# Patient Record
Sex: FEMALE | Race: BLACK OR AFRICAN AMERICAN | NOT HISPANIC OR LATINO | Employment: STUDENT | URBAN - METROPOLITAN AREA
[De-identification: names, ages, dates, MRNs, and addresses within clinical notes are randomized per-mention and may not be internally consistent; named-entity substitution may affect disease eponyms.]

---
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
## 2021-03-22 ENCOUNTER — OFFICE VISIT (OUTPATIENT)
Dept: FAMILY MEDICINE | Facility: CLINIC | Age: 21
End: 2021-03-22
Payer: COMMERCIAL

## 2021-03-22 VITALS
WEIGHT: 127 LBS | TEMPERATURE: 98.2 F | HEIGHT: 65 IN | HEART RATE: 87 BPM | DIASTOLIC BLOOD PRESSURE: 62 MMHG | SYSTOLIC BLOOD PRESSURE: 114 MMHG | OXYGEN SATURATION: 99 % | BODY MASS INDEX: 21.16 KG/M2

## 2021-03-22 DIAGNOSIS — N92.0 MENORRHAGIA WITH REGULAR CYCLE: Primary | ICD-10-CM

## 2021-03-22 DIAGNOSIS — L70.0 ACNE VULGARIS: ICD-10-CM

## 2021-03-22 PROBLEM — Z30.9 CONTRACEPTIVE MANAGEMENT: Status: ACTIVE | Noted: 2021-03-22

## 2021-03-22 LAB — HCG UR QL: NEGATIVE

## 2021-03-22 PROCEDURE — 99213 OFFICE O/P EST LOW 20 MIN: CPT | Performed by: FAMILY MEDICINE

## 2021-03-22 PROCEDURE — 81025 URINE PREGNANCY TEST: CPT | Performed by: FAMILY MEDICINE

## 2021-03-22 RX ORDER — TRETINOIN 0.25 MG/G
CREAM TOPICAL AT BEDTIME
Qty: 45 G | Refills: 1 | Status: SHIPPED | OUTPATIENT
Start: 2021-03-22 | End: 2024-01-18

## 2021-03-22 ASSESSMENT — MIFFLIN-ST. JEOR: SCORE: 1351.95

## 2021-03-22 NOTE — PROGRESS NOTES
"    Assessment & Plan     Menorrhagia with regular cycle  Pregnancy test is negative.  Starting patient on Joleen to help her with heavy menses.  Instructed to start the medication tomorrow.  Patient agrees to the plan  - HCG Qual, Urine ([id number])  - drospirenone-ethinyl estradiol (JOLEEN) 3-0.03 MG tablet; Take 1 tablet by mouth daily    Acne vulgaris  Patient has used Retin-A in the past and that helped her.  Hopefully with the OCPs acne should improve as well but Retin-A refill ordered.  - tretinoin (RETIN-A) 0.025 % external cream; Apply topically At Bedtime      Rick Reeves MD  [facility] LEW Hameed is a 20 year old who presents for the following health issues     HPI   Patient reports of getting very heavy menstrual flow.  It is regular.  She gets 10 days of heavy bleeding.  She tells that it causes fatigue.  In the past she has tried OCPs which helped her with that but because of mood disorder.  Would like to try different kind of pill.  She is not sexually active.    Also tells that she gets acne outbreaks on the face off and on.  In the past she has used Retin-A which helps.  Needs a new prescription.        Review of Systems   CONSTITUTIONAL: NEGATIVE for fever, chills, change in weight  ENT/MOUTH: NEGATIVE for ear, mouth and throat problems  RESP: NEGATIVE for significant cough or SOB  CV: NEGATIVE for chest pain, palpitations or peripheral edema      Objective    /62 (BP Location: Right arm, Cuff Size: Adult Regular)   Pulse 87   Temp 98.2  F (36.8  C) (Tympanic)   Ht 1.651 m (5' 5\")   Wt 57.6 kg (127 lb)   LMP 03/21/2021   SpO2 99%   BMI 21.13 kg/m    Body mass index is 21.13 kg/m .  Physical Exam   GENERAL: healthy, alert and no distress  RESP: lungs clear to auscultation - no rales, rhonchi or wheezes  CV: regular rate and rhythm, normal S1 S2, no S3 or S4, no murmur, click or rub, no peripheral edema and peripheral pulses strong  ABDOMEN: soft, " nontender, no hepatosplenomegaly, no masses and bowel sounds normal  SKIN: Papules noted on the face.      Results for orders placed or performed in visit on 03/22/21   HCG Qual, Urine ([id number])     Status: None   Result Value Ref Range    HCG Qual Urine Negative NEG^Negative

## 2021-03-23 ENCOUNTER — TELEPHONE (OUTPATIENT)
Dept: FAMILY MEDICINE | Facility: CLINIC | Age: 21
End: 2021-03-23

## 2021-03-23 RX ORDER — DROSPIRENONE AND ETHINYL ESTRADIOL 0.03MG-3MG
1 KIT ORAL DAILY
Qty: 90 TABLET | Refills: 1 | Status: SHIPPED | OUTPATIENT
Start: 2021-03-23 | End: 2022-01-18

## 2021-03-23 NOTE — RESULT ENCOUNTER NOTE
Please call the patient to notify patient that the pregnancy test is negative. I am ordering generic Joleen to her pharmacy. Please have her start taking the medication today.   Thanks      Rick Reeves MD

## 2021-03-23 NOTE — TELEPHONE ENCOUNTER
----- Message from Rick Reeves MD sent at 3/23/2021  8:23 AM CDT -----  Please call the patient to notify patient that the pregnancy test is negative. I am ordering generic Joleen to her pharmacy. Please have her start taking the medication today.   Thanks      Rick Reeves MD      S/w pt and gave Dr. Reeves's message above.  Pt states understanding and will go to the pharmacy and  the ocps.    Keira BUTCHER RN  EP Triage

## 2021-06-07 ENCOUNTER — OFFICE VISIT (OUTPATIENT)
Dept: FAMILY MEDICINE | Facility: CLINIC | Age: 21
End: 2021-06-07
Payer: COMMERCIAL

## 2021-06-07 VITALS
WEIGHT: 125 LBS | DIASTOLIC BLOOD PRESSURE: 62 MMHG | OXYGEN SATURATION: 100 % | BODY MASS INDEX: 20.83 KG/M2 | HEART RATE: 94 BPM | TEMPERATURE: 98.2 F | HEIGHT: 65 IN | SYSTOLIC BLOOD PRESSURE: 102 MMHG

## 2021-06-07 DIAGNOSIS — F43.22 ADJUSTMENT DISORDER WITH ANXIOUS MOOD: Primary | ICD-10-CM

## 2021-06-07 DIAGNOSIS — K21.00 GASTROESOPHAGEAL REFLUX DISEASE WITH ESOPHAGITIS WITHOUT HEMORRHAGE: ICD-10-CM

## 2021-06-07 DIAGNOSIS — T78.40XA ALLERGIC REACTION, INITIAL ENCOUNTER: ICD-10-CM

## 2021-06-07 PROCEDURE — 99214 OFFICE O/P EST MOD 30 MIN: CPT | Performed by: FAMILY MEDICINE

## 2021-06-07 ASSESSMENT — MIFFLIN-ST. JEOR: SCORE: 1337.88

## 2021-06-07 ASSESSMENT — PAIN SCALES - GENERAL: PAINLEVEL: NO PAIN (0)

## 2021-06-07 NOTE — PROGRESS NOTES
"    Assessment & Plan     Adjustment disorder with anxious mood  Has been worsening with stress and anxiety, will have her to start BCT  - MENTAL HEALTH REFERRAL  - Adult; Outpatient Treatment; Individual/Couples/Family/Group Therapy/Health Psychology[place] [facility] [place] [facility] [place] [facility] [phone]; We will contact you to schedule the appointment or please call with any questions    Gastroesophageal reflux disease with esophagitis without hemorrhage  Has mild GERD, encouraged her to try TUMS and not taking spicy food     Allergic reaction, initial encounter  Improving, encouraged her to keep monitoring and consider trial of antihistamine as needed            FUTURE APPOINTMENTS:       - Follow-up visit in 6 months for CPE    No follow-ups on file.    Tim Francis MD  [facility] LEW Hameed is a 21 year old who presents for the following health issues     HPI     Rash  Onset/Duration: after vaccine   Description  Location: both of her arms and face  Character: happened after covid vaccine   Itching: mild  Intensity:  moderate  Progression of Symptoms:  improving  Accompanying signs and symptoms:   Fever: no  Body aches or joint pain: no  Sore throat symptoms: no  Recent cold symptoms: no  History:           Previous episodes of similar rash: None  New exposures:  None  Recent travel: no  Exposure to similar rash: no  Precipitating or alleviating factors:     Therapies tried and outcome: none    Concern - troubles swallowing  Onset: x 2 weeks  Description: feels like something is stuck in throat after she is finished eating.  And other times she will have that feeling for no reason        Review of Systems   Constitutional, HEENT, cardiovascular, pulmonary, gi and gu systems are negative, except as otherwise noted.      Objective    /62   Pulse 94   Temp 98.2  F (36.8  C) (Tympanic)   Ht 1.651 m (5' 5\")   Wt 56.7 kg (125 lb)   LMP 05/30/2021   SpO2 100%   BMI 20.80 " kg/m    Body mass index is 20.8 kg/m .  Physical Exam   GENERAL: healthy, alert and no distress  EYES: Eyes grossly normal to inspection, PERRL and conjunctivae and sclerae normal  HENT: ear canals and TM's normal, nose and mouth without ulcers or lesions  NECK: no adenopathy, no asymmetry, masses, or scars and thyroid normal to palpation  RESP: lungs clear to auscultation - no rales, rhonchi or wheezes  CV: regular rate and rhythm, normal S1 S2, no S3 or S4, no murmur, click or rub, no peripheral edema and peripheral pulses strong  ABDOMEN: soft, nontender, no hepatosplenomegaly, no masses and bowel sounds normal  MS: no gross musculoskeletal defects noted, no edema  SKIN: no suspicious lesions or rashes  NEURO: Normal strength and tone, mentation intact and speech normal  BACK: no CVA tenderness, no paralumbar tenderness

## 2021-09-14 ENCOUNTER — HOSPITAL ENCOUNTER (EMERGENCY)
Facility: CLINIC | Age: 21
Discharge: LEFT BEFORE DISCHARGE | End: 2021-09-14
Payer: COMMERCIAL

## 2021-09-14 VITALS
RESPIRATION RATE: 14 BRPM | SYSTOLIC BLOOD PRESSURE: 108 MMHG | DIASTOLIC BLOOD PRESSURE: 81 MMHG | HEART RATE: 80 BPM | TEMPERATURE: 97.9 F | OXYGEN SATURATION: 99 %

## 2021-09-22 ENCOUNTER — NURSE TRIAGE (OUTPATIENT)
Dept: NURSING | Facility: CLINIC | Age: 21
End: 2021-09-22

## 2021-09-22 NOTE — TELEPHONE ENCOUNTER
Mom says patient is very sick with nausea and vomiting x 1.5 week.     Advised cannot triage without patient being present and no Consent to Communicate. Advised if she cannot get patient into clinic for a scheduled appointment then she can be seen in urgent care or ED. Advised urgent care would be faster.    Mom says she will take patient to [facility].    Additional Information    Information only question and nurse able to answer    Negative: Nursing judgment    Negative: Nursing judgment    Negative: Nursing judgment    Negative: Nursing judgment    Protocols used: INFORMATION ONLY CALL - NO TRIAGE-A-OH

## 2021-10-04 ENCOUNTER — OFFICE VISIT (OUTPATIENT)
Dept: FAMILY MEDICINE | Facility: CLINIC | Age: 21
End: 2021-10-04
Payer: COMMERCIAL

## 2021-10-04 VITALS
SYSTOLIC BLOOD PRESSURE: 103 MMHG | DIASTOLIC BLOOD PRESSURE: 75 MMHG | RESPIRATION RATE: 16 BRPM | HEIGHT: 66 IN | WEIGHT: 127.8 LBS | OXYGEN SATURATION: 98 % | TEMPERATURE: 97.8 F | HEART RATE: 88 BPM | BODY MASS INDEX: 20.54 KG/M2

## 2021-10-04 DIAGNOSIS — R51.9 DAILY HEADACHE: ICD-10-CM

## 2021-10-04 DIAGNOSIS — R11.11 INTRACTABLE VOMITING WITHOUT NAUSEA, UNSPECIFIED VOMITING TYPE: Primary | ICD-10-CM

## 2021-10-04 PROCEDURE — 99214 OFFICE O/P EST MOD 30 MIN: CPT | Performed by: NURSE PRACTITIONER

## 2021-10-04 RX ORDER — ONDANSETRON 4 MG/1
4 TABLET, ORALLY DISINTEGRATING ORAL EVERY 8 HOURS PRN
Qty: 18 TABLET | Refills: 1 | Status: SHIPPED | OUTPATIENT
Start: 2021-10-04 | End: 2024-01-18

## 2021-10-04 RX ORDER — SUCRALFATE 1 G/1
1 TABLET ORAL 4 TIMES DAILY
Qty: 56 TABLET | Refills: 0 | Status: SHIPPED | OUTPATIENT
Start: 2021-10-04 | End: 2021-10-18

## 2021-10-04 RX ORDER — PANTOPRAZOLE SODIUM 40 MG/1
40 TABLET, DELAYED RELEASE ORAL DAILY
Qty: 60 TABLET | Refills: 1 | Status: SHIPPED | OUTPATIENT
Start: 2021-10-04 | End: 2022-01-18

## 2021-10-04 ASSESSMENT — MIFFLIN-ST. JEOR: SCORE: 1365.58

## 2021-10-04 ASSESSMENT — PAIN SCALES - GENERAL: PAINLEVEL: NO PAIN (0)

## 2021-10-04 NOTE — PROGRESS NOTES
Assessment & Plan     Intractable vomiting without nausea, unspecified vomiting type  - Neurology Referral (Migraine Care Package)  - pantoprazole (PROTONIX) 40 MG EC tablet  Dispense: 60 tablet; Refill: 1  - sucralfate (CARAFATE) 1 GM tablet  Dispense: 56 tablet; Refill: 0  - ondansetron (ZOFRAN-ODT) 4 MG ODT tab  Dispense: 18 tablet; Refill: 1    Daily headache  - Neurology Referral (Migraine Care Package)    Comment: Can likely rule out gastroparesis (not related to meals; normal bowel habits; no abdominal pain, distension, bloating, burping), pregnancy, PUD (no epigastric pain); bowel obstruction (normal BMs), cannabis use. I suspect that it could be related to chronic migraine issues. She has never been able to find a regimen that satisfactorily addresses her near daily headaches, but she has never been seen by headache specialty either. I will send her to neuro / headache specialty for further workup and to explore treatment options. Also, given chronic NSAID use (for headache), I will treat for potential gastritis / GERD with sucralfate and PPI. I will also provide ondansetron to be used on days were vomiting is an issue. Discussed reasons to call or return to clinic. Zari acknowledges and demonstrates understanding of circumstances under which care should be sought urgently or emergently. Follow up as discussed. Discussed risks, benefits, alternatives, potential side effects, and proper administration of new medication / treatment. Agrees with plan of care. All questions answered.      See Patient Instructions    Return in about 4 weeks (around 11/1/2021) for persistent or worsening symptoms.    Omar Queen NP  [facility]   Zari is a 21 year old who presents for the following health issues       Concern - vomiting  Onset: 2 weeks  Description: vomiting at random times, any where from once to 4 or more times per day. Pt vomits food and bile  Intensity: mild,  "moderate  Progression of Symptoms:  same  Accompanying Signs & Symptoms: none  Previous history of similar problem: none  Precipitating factors:        Worsened by: none  Alleviating factors:        Improved by: none  Therapies tried and outcome:  none     HPI: Zari presents today with the complaint of intermittent vomiting for the past 2 weeks. New issue for her.     Started with really bad migraine 2 weeks ago.     Now vomiting in the absence of nausea, abdominal pain, or headache.     No relation to meals.    Gets a feeling in anterior neck / throat. Begins gagging and throwing up.     Hasn't gone more than 2 days without vomiting.     0-4 times per day.     No clear trigger.     No constipation or diarrhea (daily BMs).     No chance of pregnancy.    No use of cannabis.     No change in headache frequency or intensity during this episode.     Uses a lot of ibuprofen for headaches.     Review of Systems   Constitutional, HEENT, GI, neuro systems are negative, except as otherwise noted.      Objective    /75 (BP Location: Left arm, Cuff Size: Adult Regular)   Pulse 88   Temp 97.8  F (36.6  C) (Oral)   Resp 16   Ht 1.675 m (5' 5.95\")   Wt 58 kg (127 lb 12.8 oz)   SpO2 98%   BMI 20.66 kg/m    Body mass index is 20.66 kg/m .  Physical Exam   GENERAL: healthy, alert and no distress  HENT: ear canals and TM's normal, nose and mouth without ulcers or lesions  NECK: no adenopathy, no asymmetry, masses, or scars and thyroid normal to palpation  RESP: lungs clear to auscultation - no rales, rhonchi or wheezes  CV: regular rate and rhythm, normal S1 S2, no S3 or S4, no murmur, click or rub, no peripheral edema and peripheral pulses strong  ABDOMEN: soft, nontender, no hepatosplenomegaly, no masses and bowel sounds normal; Negative Maxwell sign and Rovsing sign; No guarding, rigidity, rebound tenderness.  NEURO: Normal strength and tone, mentation intact and speech normal  PSYCH: mentation appears normal, affect " normal/bright    No results found for this or any previous visit (from the past 24 hour(s)).

## 2021-10-27 ENCOUNTER — TELEPHONE (OUTPATIENT)
Dept: FAMILY MEDICINE | Facility: CLINIC | Age: 21
End: 2021-10-27

## 2021-10-27 NOTE — TELEPHONE ENCOUNTER
Pt requesting an excuse from school letter because of migraines on days 10/20 and 10/27 Phillip Queen had told them they could follow up and he would write these if this migraine continues. Pt would like to  at . Pt has been continually experiencing migraines and throwing up.   Ph. [phone]   Okay to leave detailed message.   Sonja Tee

## 2021-10-29 NOTE — TELEPHONE ENCOUNTER
Patient Contact    Called patient and relayed provider message. Scheduled virtual follow-up with Dr. Tam for excuse from school note.

## 2021-10-29 NOTE — TELEPHONE ENCOUNTER
Patient needs to be seen for evaluation in clinic with a provider if she still continues to be symptomatic.  I do not see any mention of a note for school excuse mentioned in the last note.  Holding off on writing note for now    Rick Reeves MD  [facility], [facility]

## 2021-11-02 ENCOUNTER — VIRTUAL VISIT (OUTPATIENT)
Dept: FAMILY MEDICINE | Facility: CLINIC | Age: 21
End: 2021-11-02
Payer: COMMERCIAL

## 2021-11-02 DIAGNOSIS — R51.9 DAILY HEADACHE: ICD-10-CM

## 2021-11-02 DIAGNOSIS — R51.9 NONINTRACTABLE EPISODIC HEADACHE, UNSPECIFIED HEADACHE TYPE: Primary | ICD-10-CM

## 2021-11-02 PROCEDURE — 99214 OFFICE O/P EST MOD 30 MIN: CPT | Mod: 95 | Performed by: FAMILY MEDICINE

## 2021-11-02 RX ORDER — SUMATRIPTAN 25 MG/1
25 TABLET, FILM COATED ORAL
Qty: 18 TABLET | Refills: 1 | Status: SHIPPED | OUTPATIENT
Start: 2021-11-02 | End: 2021-12-03

## 2021-11-02 NOTE — PATIENT INSTRUCTIONS
"  Patient Education     Self-Care for Headaches  Most headaches aren't serious and can be relieved with self-care. But some headaches may be a sign of another health problem like eye trouble or high blood pressure. To find the best treatment, learn what kind of headaches you get. For tension headaches, self-care will usually help. To treat migraines, ask your healthcare provider for advice. It's also possible to get both tension and migraine headaches. Self-care involves relieving the pain and avoiding headache \"triggers\" if you can.     Ways to reduce pain and tension  Try these steps:    Apply a cold compress or ice pack to the pain site.    Drink fluids. If nausea makes it hard to drink, try sucking on ice.    Rest. Protect yourself from bright light and loud noises.    Calm your emotions by imagining a peaceful scene.    Massage tight neck, shoulder, and head muscles.    To relax muscles, soak in a hot bath or use a hot shower.  Use medicines  Aspirin or other over-the-counter (OTC) pain medicines such as ibuprofen and acetaminophen can relieve headache. Remember: Never give aspirin to anyone 18 years old or younger because of the risk of getting Reye syndrome. Use pain medicines only when needed. Certain prescription and OTC medicines can lead to rebound headaches if they are taken too often. Check with your healthcare provider or pharmacist about your medicines.   Track your headaches  Keeping a headache diary can help you and your healthcare provider identify what's causing your headaches:     Note when each headache happens.    Identify your activities and the foods you've eaten 6 to 8 hours before the headache began.    Look for any trends or \"triggers.\"    Bring the information to your next medical appointment.  Signs of tension headache  Any of the following can be signs:     Dull pain or feeling of pressure in a tight band around your head    Pain in your neck or shoulders    Headache without a definite " "beginning or end    Headache after an activity such as driving or working on a computer  Signs of migraine  Any of the following can be signs:     Throbbing pain on one or both sides of your head    Nausea or vomiting    Extreme sensitivity to light, sound, and smells    Bright spots, flashes, or other visual changes    Pain or nausea so severe that you can't continue your daily activities  When to call your healthcare provider   Call your healthcare provider if any of these occur:     A headache that lingers after a recent injury or bump to the head    A headache that lasts for more than 3 days    Frequent headaches, especially in the morning  Get medical care right away if you have:    A headache along with slurred speech, changes in your vision, or numbness or weakness in your arms or legs    Headaches with seizures    A fever with a stiff neck or pain when you bend your head toward your chest    A headache that you would call \"the worst headache you have ever had\" or a severe, persistent headache that gets worse or doesn't get better with treatment  Veronica last reviewed this educational content on 7/1/2020 2000-2021 [employer]. All rights reserved. This information is not intended as a substitute for professional medical care. Always follow your healthcare professional's instructions.           "

## 2021-11-02 NOTE — LETTER
[facility]  [address] [address]  Phone: [phone]    November 2, 2021        Zari Luke  [address]  [place] [facility] [place] [address]          To whom it may concern:    RE: Zari GARCIA Ti    Patient was seen and treated today at our clinic and missed school  10/27/2021     Please contact me for questions or concerns.      Sincerely,        Deanna Tam MD

## 2021-11-02 NOTE — PROGRESS NOTES
Zari is a 21 year old who is being evaluated via a billable telephone visit.      What phone number would you like to be contacted at? [phone]  How would you like to obtain your AVS? Mail a copy    Assessment & Plan     (R51.9) Nonintractable episodic headache, unspecified headache type  (primary encounter diagnosis)  Comment:   Plan: SUMAtriptan (IMITREX) 25 MG tablet                  (R51.9) Daily headache  Comment:   Plan:    Her nausea has improved and need Zofran as needed , that sometimes can help abort her HA  but still gets migraine HA  sometimes    Treatment today as per [facility] orders. Willing to try Imitrex again as it has bene  Given in past ans she does not recall how if helped much or not but Advil is not working as good   . Asked pt to keep diary of her migraine. Talked about triggers, symptomatic treatment etc. Follow up check  in 3-4 weeks with PCP but , sooner  prn if pain does not resolve after treatment or  problem .   If frequent HA, consider further evaluation and she was also asked to keep her neurology a[appointment .  Note given for missing work last week , she will  letter tomorrow     Patient expressed understanding and agreement with treatment plan. All patient's questions were answered, will let me know if has more later.  Medications: Rx's: Reviewed the potential side effects/complications of medications prescribed.         Deanna Tam MD  [facility]   Zari is a 21 year old who presents for the following health issues              HPI     Concern - follow up  Onset:   Description: pt is following up on Migraines/ Daily HA , pt states that she is feeling  Somewhat  better,-  Has not  seen neurology but has appointment next month .      Her nausea / vomiting has improved. Zofran helps. only vomiting maybe once a week or so  And vomiting can be with migraine but sometimes randomly without migraine too .      HA are  Not as  frequent now may be  still twice a week and Advil  does resolve the pain but sometimes not as effective .      Pt needs a note for missing school  last wednesday - 10/ [id number] . She is  in school studying finance   Intensity: moderate  Progression of Symptoms:  improving  Accompanying Signs & Symptoms: none  Previous history of similar problem: yes  Precipitating factors:        Worsened by: none  Alleviating factors:        Improved by: none  Therapies tried and outcome:  none       Migraine / HA FOLLOW UP     Since your last clinic visit, how have your headaches changed?  Improved    How often are you getting headaches or migraines?  2-3 / week , so they are less frequent and not as intense now     Are you able to do normal daily activities when you have a migraine? Yes    Are you taking rescue/relief medications? (Select all that apply) ibuprofen (Advil, Motrin). Was given Imitrex previously , but she not sure how it worked but willing to try it again     How helpful is your rescue/relief medication?  I get only a small amount of relief  From  Advil but not always     Are you taking any medications to prevent migraines? (Select all that apply)  No    In the past 4 weeks, how often have you gone to urgent care or the emergency room because of your headaches?  0      Review of Systems   Constitutional, HEENT, cardiovascular, pulmonary, GI, , musculoskeletal, neuro, skin, endocrine and psych systems are negative, except as otherwise noted.      Objective           Vitals:  No vitals were obtained today due to virtual visit.    Physical Exam   healthy, alert and no distress  PSYCH: Alert and oriented times 3; coherent speech, normal   rate and volume, able to articulate logical thoughts, able   to abstract reason, no tangential thoughts, no hallucinations   or delusions  Her affect is normal  RESP: No cough, no audible wheezing, able to talk in full sentences  Remainder of exam unable to be completed due to  telephone visits      Phone call duration: 16  minutes

## 2021-12-02 NOTE — PROGRESS NOTES
[facility]  Section of General Neurology  New Patient Visit      Zari Luke MRN# [id number]   Age: 21 year old YOB: 2000     Requesting physician: Rick Jones     Reason for Consultation: Headaches        History of Presenting Symptoms:   Zari Luke is a 21 year old female who presents today for evaluation of headaches.    She is currently taking imitrex 25 mg PRN for headache.    She was on oral birth control previously and she thought this was related    Headache specific questions:  Location (unilateral/whole head/etc): started in 2019, worsening in that they she now vomits multiple times per week.  Sometimes she just vomits randomly.    Pounding on both sides.  Frequency: In October/Sept 2-3x a week, now still 1-2x a week.   Provoking factors--no clear provoking factors  Visual changes--no  Nausea/vomiting:  yes  Sensitivity to light/sound: +sensitivity to light  Liquid intake:  Could be better  Positional --not clearly, neck is stiff sometimes  Wake Up--sometimes will wake her out of sleep.  Sleep (including REGGIE screen)--wakes up a lot.  No snoring  Family history of headaches--none  Mood/Stress--depends on the day.   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  Caffeine--none  Medication overuse screen--sometimes took too many  Previous medications tried:  Prophylactic:  Abortive: never took the imitrex  Headache days/month  Headache related changes in life  Headache related disability (days work missed, etc)  Previous imaging--no            Past Medical History:     Patient Active Problem List   Diagnosis     Episodic tension-type headache, not intractable     Fatigue, unspecified type     Menorrhagia with regular cycle     Neck pain     Daily headache     Cervical pain     Tension headache     Contraceptive management     Nonintractable episodic headache, unspecified headache type     Past Medical  "History:   Diagnosis Date     Chronic tonsillitis 2005    s/p tonsillectomy     Left forearm fracture 2009     Mononucleosis 10/2014        Past Surgical History:     Past Surgical History:   Procedure Laterality Date     TONSILLECTOMY  2005        Social History:     Social History     Tobacco Use     Smoking status: Never Smoker     Smokeless tobacco: Never Used   Substance Use Topics     Alcohol use: No     Alcohol/week: 0.0 standard drinks     Drug use: No        Family History:     Family History   Problem Relation Age of Onset     Heart Disease Maternal Grandfather      Diabetes No family hx of      Lipids No family hx of         Medications:     Current Outpatient Medications   Medication Sig     drospirenone-ethinyl estradiol (ERIBERTO) 3-0.03 MG tablet Take 1 tablet by mouth daily (Patient not taking: Reported on 6/7/2021)     ondansetron (ZOFRAN-ODT) 4 MG ODT tab Take 1 tablet (4 mg) by mouth every 8 hours as needed for vomiting     pantoprazole (PROTONIX) 40 MG EC tablet Take 1 tablet (40 mg) by mouth daily     SUMAtriptan (IMITREX) 25 MG tablet Take 1 tablet (25 mg) by mouth at onset of headache for migraine May repeat in 2 hours. Max 8 tablets/24 hours.     tretinoin (RETIN-A) 0.025 % external cream Apply topically At Bedtime (Patient not taking: Reported on 6/7/2021)     No current facility-administered medications for this visit.        Allergies:     Allergies   Allergen Reactions     Compazine [Prochlorperazine] Other (See Comments)     Lock jaw        Review of Systems:   As noted above     Physical Exam:   Vitals: /74   Pulse 84   Ht 1.651 m (5' 5\")   Wt 59.8 kg (131 lb 12.8 oz)   SpO2 100%   BMI 21.93 kg/m     CV: peripheral pulse appreciated  Lungs: breathing comfortably  Extremities: no edema    Neuro:   General Appearance: No apparent distress, well-nourished, well-groomed, pleasant     Mental Status: Alert and oriented to person, place, and time. Speech fluent and comprehension " intact. No dysarthria.   Fundoscopic Exam: Optic discs partially visualized no clear papilledema.    Cranial Nerves:   II: Visual fields: normal  III: Pupils: 3 mm, equal, round, reactive to light   III,IV,VI: Extraocular Movements: intact   V: Facial sensation: intact to light touch  VII: Facial strength: intact without asymmetry  VIII: Hearing: intact grossly  IX: Palate: intact   XI: Shoulder shrug: intact  XII: Tongue movement: normal     Motor Exam:     5/5 Diffusely    No drift is present. No abnormal movements. Tone is normal throughout.    Sensory: intact to light touch    Coordination: no dysmetria with finger-to-nose bilaterally    Reflexes: biceps, triceps, brachioradialis, patellar, and ankle jerks 2+ and symmetric.     Gait: normal casual gait, normal stride length         Data: Pertinent prior to visit   Imaging:      Procedures:      Laboratory:  Lab Results   Component Value Date    WBC 6.6 01/12/2020     Lab Results   Component Value Date    RBC 4.77 01/12/2020     Lab Results   Component Value Date    HGB 13.8 01/12/2020     Lab Results   Component Value Date    HCT 39.9 01/12/2020     No components found for: MCT  Lab Results   Component Value Date    MCV 84 01/12/2020     Lab Results   Component Value Date    MCH 28.9 01/12/2020     Lab Results   Component Value Date    MCHC 34.6 01/12/2020     Lab Results   Component Value Date    RDW 12.4 01/12/2020     Lab Results   Component Value Date     01/12/2020     Last Comprehensive Metabolic Panel:  Sodium   Date Value Ref Range Status   01/12/2020 137 133 - 144 mmol/L Final     Potassium   Date Value Ref Range Status   01/12/2020 3.8 3.4 - 5.3 mmol/L Final     Chloride   Date Value Ref Range Status   01/12/2020 107 96 - 110 mmol/L Final     Carbon Dioxide   Date Value Ref Range Status   01/12/2020 22 20 - 32 mmol/L Final     Anion Gap   Date Value Ref Range Status   01/12/2020 8 3 - 14 mmol/L Final     Glucose   Date Value Ref Range Status    01/12/2020 117 (H) 70 - 99 mg/dL Final     Urea Nitrogen   Date Value Ref Range Status   01/12/2020 10 7 - 19 mg/dL Final     Creatinine   Date Value Ref Range Status   01/12/2020 0.50 0.50 - 1.00 mg/dL Final     GFR Estimate   Date Value Ref Range Status   01/12/2020 >90 >60 mL/min/[1.73_m2] Final     Comment:     Non  GFR Calc  Starting 12/18/2018, serum creatinine based estimated GFR (eGFR) will be   calculated using the Chronic Kidney Disease Epidemiology Collaboration   (CKD-EPI) equation.       Calcium   Date Value Ref Range Status   01/12/2020 9.5 8.5 - 10.1 mg/dL Final              Assessment and Plan:   Assessment:  Zari Luke is a 21 year old female who presents today for evaluation of headaches.  To history and exam they do sound most likely migrainous.  The degree of her vomiting and that the vomiting can happen without associated headache in addition to the headaches waking her up from sleep at times would be the chief reasons I would want to get imaging, though if normal I do think migraine could explain these symptoms.  I do think overall her headaches and vomiting could continue to improve with a daily medication.  Our goal would be to lessen the headaches in frequency and intensity so she can continue school and work in a normal fashion.      --MRI brain w/o contrast  --Nortriptiline 25 mg QHS  --Continue imitrex, zofran PRN for headaches, vomiting  --Discussed headache hygiene  --If vomiting continues or worsens with concurrent headache improvement could consider approaching the vomiting from a GI perspective but for now would say that it could all be related to migraines, will assess clinical response and await imaging.  --F/u in 3 months to call or mychart with questions in the mean time.            Timmy Jaeger MD   of Neurology   [facility]      The total time of this encounter today amounted to 45 minutes. This time included  time spent with the patient, prep work, ordering tests, and performing post visit documentation.

## 2021-12-03 ENCOUNTER — OFFICE VISIT (OUTPATIENT)
Dept: NEUROLOGY | Facility: CLINIC | Age: 21
End: 2021-12-03
Attending: NURSE PRACTITIONER
Payer: COMMERCIAL

## 2021-12-03 VITALS
DIASTOLIC BLOOD PRESSURE: 74 MMHG | OXYGEN SATURATION: 100 % | WEIGHT: 131.8 LBS | HEIGHT: 65 IN | BODY MASS INDEX: 21.96 KG/M2 | HEART RATE: 84 BPM | SYSTOLIC BLOOD PRESSURE: 113 MMHG

## 2021-12-03 DIAGNOSIS — R11.11 INTRACTABLE VOMITING WITHOUT NAUSEA, UNSPECIFIED VOMITING TYPE: ICD-10-CM

## 2021-12-03 DIAGNOSIS — G43.009 MIGRAINE WITHOUT AURA AND WITHOUT STATUS MIGRAINOSUS, NOT INTRACTABLE: ICD-10-CM

## 2021-12-03 PROCEDURE — G0463 HOSPITAL OUTPT CLINIC VISIT: HCPCS

## 2021-12-03 PROCEDURE — 99204 OFFICE O/P NEW MOD 45 MIN: CPT | Performed by: STUDENT IN AN ORGANIZED HEALTH CARE EDUCATION/TRAINING PROGRAM

## 2021-12-03 RX ORDER — SUMATRIPTAN 25 MG/1
25 TABLET, FILM COATED ORAL
Qty: 9 TABLET | Refills: 2 | Status: SHIPPED | OUTPATIENT
Start: 2021-12-03 | End: 2022-10-19

## 2021-12-03 RX ORDER — NORTRIPTYLINE HCL 25 MG
25 CAPSULE ORAL AT BEDTIME
Qty: 30 CAPSULE | Refills: 4 | Status: SHIPPED | OUTPATIENT
Start: 2021-12-03 | End: 2024-01-18

## 2021-12-03 ASSESSMENT — MIFFLIN-ST. JEOR: SCORE: 1368.72

## 2021-12-03 NOTE — PATIENT INSTRUCTIONS
Nortriptyline 25 mg at bedtime  --common SE drowsiness, dry mouth  For bad nausea--zofran  For bad headaches, imitrex is a good option, fluids, dark quiet room as able.    I hope this improves the vomiting too, if headaches get better but vomiting does not.  Consider a more GI perspective through your primary doctor perhaps.

## 2021-12-03 NOTE — LETTER
12/3/2021         RE: Zari Luke  [address] [place] [address]        Dear Colleague,    Thank you for referring your patient, Zari Luke, to the [facility] [place]. Please see a copy of my visit note below.    [facility]/[place]  Section of General Neurology  New Patient Visit      Zari Luke MRN# [id number]   Age: 21 year old YOB: 2000     Requesting physician: Rick Jones     Reason for Consultation: Headaches        History of Presenting Symptoms:   Zari Luke is a 21 year old female who presents today for evaluation of headaches.    She is currently taking imitrex 25 mg PRN for headache.    She was on oral birth control previously and she thought this was related    Headache specific questions:  Location (unilateral/whole head/etc): started in 2019, worsening in that they she now vomits multiple times per week.  Sometimes she just vomits randomly.    Pounding on both sides.  Frequency: In October/Sept 2-3x a week, now still 1-2x a week.   Provoking factors--no clear provoking factors  Visual changes--no  Nausea/vomiting:  yes  Sensitivity to light/sound: +sensitivity to light  Liquid intake:  Could be better  Positional --not clearly, neck is stiff sometimes  Wake Up--sometimes will wake her out of sleep.  Sleep (including REGGIE screen)--wakes up a lot.  No snoring  Family history of headaches--none  Mood/Stress--depends on the day.   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  Caffeine--none  Medication overuse screen--sometimes took too many  Previous medications tried:  Prophylactic:  Abortive: never took the imitrex  Headache days/month  Headache related changes in life  Headache related disability (days work missed, etc)  Previous imaging--no            Past Medical History:     Patient Active Problem List   Diagnosis     Episodic tension-type headache,  "not intractable     Fatigue, unspecified type     Menorrhagia with regular cycle     Neck pain     Daily headache     Cervical pain     Tension headache     Contraceptive management     Nonintractable episodic headache, unspecified headache type     Past Medical History:   Diagnosis Date     Chronic tonsillitis 2005    s/p tonsillectomy     Left forearm fracture 2009     Mononucleosis 10/2014        Past Surgical History:     Past Surgical History:   Procedure Laterality Date     TONSILLECTOMY  2005        Social History:     Social History     Tobacco Use     Smoking status: Never Smoker     Smokeless tobacco: Never Used   Substance Use Topics     Alcohol use: No     Alcohol/week: 0.0 standard drinks     Drug use: No        Family History:     Family History   Problem Relation Age of Onset     Heart Disease Maternal Grandfather      Diabetes No family hx of      Lipids No family hx of         Medications:     Current Outpatient Medications   Medication Sig     drospirenone-ethinyl estradiol (ERIBERTO) 3-0.03 MG tablet Take 1 tablet by mouth daily (Patient not taking: Reported on 6/7/2021)     ondansetron (ZOFRAN-ODT) 4 MG ODT tab Take 1 tablet (4 mg) by mouth every 8 hours as needed for vomiting     pantoprazole (PROTONIX) 40 MG EC tablet Take 1 tablet (40 mg) by mouth daily     SUMAtriptan (IMITREX) 25 MG tablet Take 1 tablet (25 mg) by mouth at onset of headache for migraine May repeat in 2 hours. Max 8 tablets/24 hours.     tretinoin (RETIN-A) 0.025 % external cream Apply topically At Bedtime (Patient not taking: Reported on 6/7/2021)     No current facility-administered medications for this visit.        Allergies:     Allergies   Allergen Reactions     Compazine [Prochlorperazine] Other (See Comments)     Lock jaw        Review of Systems:   As noted above     Physical Exam:   Vitals: /74   Pulse 84   Ht 1.651 m (5' 5\")   Wt 59.8 kg (131 lb 12.8 oz)   SpO2 100%   BMI 21.93 kg/m     CV: peripheral " pulse appreciated  Lungs: breathing comfortably  Extremities: no edema    Neuro:   General Appearance: No apparent distress, well-nourished, well-groomed, pleasant     Mental Status: Alert and oriented to person, place, and time. Speech fluent and comprehension intact. No dysarthria.   Fundoscopic Exam: Optic discs partially visualized no clear papilledema.    Cranial Nerves:   II: Visual fields: normal  III: Pupils: 3 mm, equal, round, reactive to light   III,IV,VI: Extraocular Movements: intact   V: Facial sensation: intact to light touch  VII: Facial strength: intact without asymmetry  VIII: Hearing: intact grossly  IX: Palate: intact   XI: Shoulder shrug: intact  XII: Tongue movement: normal     Motor Exam:     5/5 Diffusely    No drift is present. No abnormal movements. Tone is normal throughout.    Sensory: intact to light touch    Coordination: no dysmetria with finger-to-nose bilaterally    Reflexes: biceps, triceps, brachioradialis, patellar, and ankle jerks 2+ and symmetric.     Gait: normal casual gait, normal stride length         Data: Pertinent prior to visit   Imaging:      Procedures:      Laboratory:  Lab Results   Component Value Date    WBC 6.6 01/12/2020     Lab Results   Component Value Date    RBC 4.77 01/12/2020     Lab Results   Component Value Date    HGB 13.8 01/12/2020     Lab Results   Component Value Date    HCT 39.9 01/12/2020     No components found for: MCT  Lab Results   Component Value Date    MCV 84 01/12/2020     Lab Results   Component Value Date    MCH 28.9 01/12/2020     Lab Results   Component Value Date    MCHC 34.6 01/12/2020     Lab Results   Component Value Date    RDW 12.4 01/12/2020     Lab Results   Component Value Date     01/12/2020     Last Comprehensive Metabolic Panel:  Sodium   Date Value Ref Range Status   01/12/2020 137 133 - 144 mmol/L Final     Potassium   Date Value Ref Range Status   01/12/2020 3.8 3.4 - 5.3 mmol/L Final     Chloride   Date Value Ref  Range Status   01/12/2020 107 96 - 110 mmol/L Final     Carbon Dioxide   Date Value Ref Range Status   01/12/2020 22 20 - 32 mmol/L Final     Anion Gap   Date Value Ref Range Status   01/12/2020 8 3 - 14 mmol/L Final     Glucose   Date Value Ref Range Status   01/12/2020 117 (H) 70 - 99 mg/dL Final     Urea Nitrogen   Date Value Ref Range Status   01/12/2020 10 7 - 19 mg/dL Final     Creatinine   Date Value Ref Range Status   01/12/2020 0.50 0.50 - 1.00 mg/dL Final     GFR Estimate   Date Value Ref Range Status   01/12/2020 >90 >60 mL/min/[1.73_m2] Final     Comment:     Non  GFR Calc  Starting 12/18/2018, serum creatinine based estimated GFR (eGFR) will be   calculated using the Chronic Kidney Disease Epidemiology Collaboration   (CKD-EPI) equation.       Calcium   Date Value Ref Range Status   01/12/2020 9.5 8.5 - 10.1 mg/dL Final              Assessment and Plan:   Assessment:  Zari Luke is a 21 year old female who presents today for evaluation of headaches.  To history and exam they do sound most likely migrainous.  The degree of her vomiting and that the vomiting can happen without associated headache in addition to the headaches waking her up from sleep at times would be the chief reasons I would want to get imaging, though if normal I do think migraine could explain these symptoms.  I do think overall her headaches and vomiting could continue to improve with a daily medication.  Our goal would be to lessen the headaches in frequency and intensity so she can continue school and work in a normal fashion.      --MRI brain w/o contrast  --Nortriptiline 25 mg QHS  --Continue imitrex, zofran PRN for headaches, vomiting  --Discussed headache hygiene  --If vomiting continues or worsens with concurrent headache improvement could consider approaching the vomiting from a GI perspective but for now would say that it could all be related to migraines, will assess clinical response and await  imaging.  --F/u in 3 months to call or mychart with questions in the mean time.            Timmy Jaeger MD   of Neurology   [facility]      The total time of this encounter today amounted to 45 minutes. This time included time spent with the patient, prep work, ordering tests, and performing post visit documentation.        Again, thank you for allowing me to participate in the care of your patient.        Sincerely,        Andrei Jaeger MD

## 2021-12-03 NOTE — NURSING NOTE
"Zari Luke is a 21 year old female who presents for:  Chief Complaint   Patient presents with     Referral     Headache/Migrane        Initial Vitals:  /74   Pulse 84   Ht 1.651 m (5' 5\")   Wt 59.8 kg (131 lb 12.8 oz)   SpO2 100%   BMI 21.93 kg/m   Estimated body mass index is 21.93 kg/m  as calculated from the following:    Height as of this encounter: 1.651 m (5' 5\").    Weight as of this encounter: 59.8 kg (131 lb 12.8 oz).. Body surface area is 1.66 meters squared. BP completed using cuff size: small regular  Data Unavailable    Nursing Comments:     Maribel Desai    "

## 2021-12-07 NOTE — PROGRESS NOTES
Subjective:  HPI  Physical Exam                    Objective:  System    Physical Exam    General     ROS    Assessment/Plan:    DISCHARGE REPORT    Progress reporting period is from 10/01/2020 to 10/01/2020.       SUBJECTIVE  Subjective changes noted by patient:  Patient was seen on 10/01/2020 for initial evaluation of her neck pain and headaches.  Current status is unknown as patient did not return for additional follow-up visits.  Current Pain level:  Unknown as patient did not return for additional follow-up visits.  Initial Pain level: 2/10.   Changes in function:  Unknown as patient did not return for additional follow-up visits.  Adverse reaction to treatment or activity: Unknown as patient did not return for additional follow-up visits.    OBJECTIVE  Changes noted in objective findings:  Current objective measurements are unavailable as patient did not return for additional follow-up visits.        ASSESSMENT/PLAN  Patient was only seen for 1 visit with treatment focusing on cervical retraction exercises and posture to address neck pain and headaches.  She had a good response to posture correction in sitting using a lumbar roll at her initial visit with abolishment of B neck pain.  She has not returned for additional follow-up visits so current assessment is unavailable.  Updated problem list and treatment plan: Diagnosis 1:  Neck pain, HAs   No updated problem list or treatment plan as patient did not return for additional visits and is discharged from PT at this time.    STG/LTGs have been met or progress has been made towards goals:  Unknown as patient did not return for additional follow-up visits.  Assessment of Progress: The patient has not returned to therapy. Current status is unknown.  Self Management Plans:  Patient has been instructed in a home treatment program.  Patient  has been instructed in self management of symptoms.  Amal continues to require the following intervention to meet STG and  LTG's:  PT intervention is no longer required to meet STG/LTG.    Recommendations:  Patient is discharged from PT as she did not return for further follow-up visits.    Please refer to the daily flowsheet for treatment today, total treatment time and time spent performing 1:1 timed codes.

## 2022-01-18 ENCOUNTER — OFFICE VISIT (OUTPATIENT)
Dept: FAMILY MEDICINE | Facility: CLINIC | Age: 22
End: 2022-01-18
Payer: COMMERCIAL

## 2022-01-18 VITALS
TEMPERATURE: 97.1 F | OXYGEN SATURATION: 98 % | HEART RATE: 89 BPM | RESPIRATION RATE: 12 BRPM | DIASTOLIC BLOOD PRESSURE: 62 MMHG | BODY MASS INDEX: 21.13 KG/M2 | SYSTOLIC BLOOD PRESSURE: 98 MMHG | WEIGHT: 127 LBS

## 2022-01-18 DIAGNOSIS — K21.9 GASTROESOPHAGEAL REFLUX DISEASE, UNSPECIFIED WHETHER ESOPHAGITIS PRESENT: ICD-10-CM

## 2022-01-18 DIAGNOSIS — Z11.8 SPECIAL SCREENING EXAMINATION FOR CHLAMYDIAL DISEASE: ICD-10-CM

## 2022-01-18 DIAGNOSIS — E01.0 THYROMEGALY: ICD-10-CM

## 2022-01-18 DIAGNOSIS — R13.13 PHARYNGEAL DYSPHAGIA: Primary | ICD-10-CM

## 2022-01-18 DIAGNOSIS — R79.89 LOW TSH LEVEL: ICD-10-CM

## 2022-01-18 PROCEDURE — 99214 OFFICE O/P EST MOD 30 MIN: CPT | Performed by: INTERNAL MEDICINE

## 2022-01-18 RX ORDER — FAMOTIDINE 20 MG/1
20 TABLET, FILM COATED ORAL 2 TIMES DAILY PRN
Qty: 60 TABLET | Refills: 1 | Status: SHIPPED | OUTPATIENT
Start: 2022-01-18 | End: 2024-01-18

## 2022-01-18 RX ORDER — RNA INGREDIENT BNT-162B2 0.23 G/1.8ML
INJECTION, SUSPENSION INTRAMUSCULAR
COMMUNITY
Start: 2021-06-22 | End: 2024-01-18

## 2022-01-18 ASSESSMENT — PAIN SCALES - GENERAL: PAINLEVEL: NO PAIN (0)

## 2022-01-18 NOTE — PATIENT INSTRUCTIONS
As discussed, please do LAB only appointment for non fasting labs as discussed.     Started on medication for your Nausea, Vomitings.     Please do the Ultrasound of the neck given your thyroid gland appearing mildly enlarged on my exam today. And also LOW thyroid function in 2019 as reviewed with you.     Please do the Ultrasound thyroid ordered.     [facility]  ( [facility] )   Call : [phone]  Toll Free : [phone]    ==============================    [facility]  ( [place] [place] )   [facility]  Call : [phone]  Toll Free : [phone]    ==============================  [facility]  Phone : [phone]    ===============================    [facility]   ( All Locations )   Call : [phone]  Toll Free : [phone]        =====================    Patient Education     The Role of the Thyroid Gland   The thyroid is a gland in the neck, just below the voicebox. It is an endocrine gland. Endocrine glands make hormones. These are chemicals that carry messages through the bloodstream to other parts of the body. The thyroid gland makes thyroid hormones. The thyroid gland is managed by the pituitary gland, which sits at the base of the brain.     Keeping the body working right  Thyroid hormones help keep all the cells in the body working right. It does this by controlling the metabolism. This is the rate at which every part of the body functions. The right amount of thyroid hormones keep the metabolism at a healthy pace. This helps the brain, heart, muscles, and other organs work well. A balanced metabolism also helps ensure a healthy temperature, heart rate, energy level, and growth rate. Thyroid hormones also play a vital role in children's growth.   The thyroid-pituitary cycle  The thyroid hormone needs to be kept at a healthy level. A complex cycle maintains this level. The cycle starts with the  pituitary gland. This gland checks the thyroid hormone level in the blood.   Depending on the level, the pituitary sends thyroid stimulating hormone (TSH) through the blood to the thyroid gland. TSH tells the thyroid gland how much thyroid hormone to make. In response to TSH, the thyroid makes thyroid hormone. Then thyroid hormone is sent into the blood to the rest of the body. The pituitary senses the hormone level, adjusts the TSH level, and the cycle continues.   [employer] last reviewed this educational content on 12/1/2019 2000-2021 [employer]. All rights reserved. This information is not intended as a substitute for professional medical care. Always follow your healthcare professional's instructions.

## 2022-01-18 NOTE — PROGRESS NOTES
Assessment and Plan  1. Pharyngeal dysphagia  2. Low TSH level  3. Thyromegaly  New problem, added to patient list today. Differential diagnosis of assessment mentioned above and Anemia causing this. Does have GERD issues in the past and was on protonix. Not taking currently. Will start on H2 blockers.   - US Head Neck Soft Tissue; Future  - TSH with free T4 reflex; Future  - T3, Free; Future  - CBC with platelets; Future    4. Gastroesophageal reflux disease, unspecified whether esophagitis present  - famotidine (PEPCID) 20 MG tablet; Take 1 tablet (20 mg) by mouth 2 times daily as needed (Nausea and Vomiting)  Dispense: 60 tablet; Refill: 1    5. Special screening examination for chlamydial disease  - CHLAMYDIA TRACHOMATIS PCR; Future       Patient Instructions   As discussed, please do LAB only appointment for non fasting labs as discussed.     Started on medication for your Nausea, Vomitings.     Please do the Ultrasound of the neck given your thyroid gland appearing mildly enlarged on my exam today. And also LOW thyroid function in 2019 as reviewed with you.     Please do the Ultrasound thyroid ordered.     [facility]  ( [facility] )   Call : [phone]  Toll Free : [phone]    ==============================    [facility]  ( [facility] )   [facility]  Call : [phone]  Toll Free : [phone]    ==============================  [facility]  Phone : [phone]    ===============================    [facility]   ( All Locations )   Call : [phone]  Toll Free : [phone]        =====================    Patient Education     The Role of the Thyroid Gland   The thyroid is a gland in the neck, just below the voicebox. It is an endocrine gland. Endocrine glands make hormones. These are chemicals that carry messages through the bloodstream to other parts of the body. The thyroid gland makes  thyroid hormones. The thyroid gland is managed by the pituitary gland, which sits at the base of the brain.     Keeping the body working right  Thyroid hormones help keep all the cells in the body working right. It does this by controlling the metabolism. This is the rate at which every part of the body functions. The right amount of thyroid hormones keep the metabolism at a healthy pace. This helps the brain, heart, muscles, and other organs work well. A balanced metabolism also helps ensure a healthy temperature, heart rate, energy level, and growth rate. Thyroid hormones also play a vital role in children's growth.   The thyroid-pituitary cycle  The thyroid hormone needs to be kept at a healthy level. A complex cycle maintains this level. The cycle starts with the pituitary gland. This gland checks the thyroid hormone level in the blood.   Depending on the level, the pituitary sends thyroid stimulating hormone (TSH) through the blood to the thyroid gland. TSH tells the thyroid gland how much thyroid hormone to make. In response to TSH, the thyroid makes thyroid hormone. Then thyroid hormone is sent into the blood to the rest of the body. The pituitary senses the hormone level, adjusts the TSH level, and the cycle continues.   [employer] last reviewed this educational content on 12/1/2019 2000-2021 [employer]. All rights reserved. This information is not intended as a substitute for professional medical care. Always follow your healthcare professional's instructions.             Return in about 4 weeks (around 2/15/2022), or if symptoms worsen or fail to improve, for Preventative Visit.    Zarina Zavaleta MD  [facility] 21 [facility] who presents for the following health issues       HPI     Chief Complaint   Patient presents with     Throat Problem     since October - patient states that feel something on throat, has vomited a couple of  times, has been seen before for the same sx.      Vomiting     since October           Allergies   Allergen Reactions     Compazine [Prochlorperazine] Other (See Comments)     Lock jaw        Past Medical History:   Diagnosis Date     Chronic tonsillitis 2005    s/p tonsillectomy     Left forearm fracture 2009     Mononucleosis 10/2014       Past Surgical History:   Procedure Laterality Date     TONSILLECTOMY  2005       Family History   Problem Relation Age of Onset     Heart Disease Maternal Grandfather      Diabetes No family hx of      Lipids No family hx of        Social History     Tobacco Use     Smoking status: Never Smoker     Smokeless tobacco: Never Used   Substance Use Topics     Alcohol use: No     Alcohol/week: 0.0 standard drinks        Current Outpatient Medications   Medication     famotidine (PEPCID) 20 MG tablet     nortriptyline (PAMELOR) 25 MG capsule     ondansetron (ZOFRAN-ODT) 4 MG ODT tab     SUMAtriptan (IMITREX) 25 MG tablet     tretinoin (RETIN-A) 0.025 % external cream     PFIZER-BIONTECH COVID-19 VACC 30 MCG/0.3ML injection     No current facility-administered medications for this visit.        Review of Systems   Constitutional, HEENT, cardiovascular, pulmonary, GI, , musculoskeletal, neuro, skin, endocrine and psych systems are negative, except as otherwise noted.      Objective    BP 98/62   Pulse 89   Temp 97.1  F (36.2  C) (Temporal)   Resp 12   Wt 57.6 kg (127 lb)   SpO2 98%   Breastfeeding No   BMI 21.13 kg/m    Body mass index is 21.13 kg/m .  Physical Exam   GENERAL: healthy, alert and no distress  NECK: no adenopathy, no asymmetry, masses, or scars and thyroid mildly enlarged to palpation  RESP: lungs clear to auscultation - no rales, rhonchi or wheezes  CV: regular rate and rhythm, normal S1 S2, no S3 or S4, no murmur, click or rub, no peripheral edema and peripheral pulses strong  ABDOMEN: soft, nontender, no hepatosplenomegaly, no masses and bowel sounds  normal  MS: no gross musculoskeletal defects noted, no edema

## 2022-01-24 ENCOUNTER — HOSPITAL ENCOUNTER (OUTPATIENT)
Dept: ULTRASOUND IMAGING | Facility: CLINIC | Age: 22
Discharge: HOME | End: 2022-01-24
Attending: INTERNAL MEDICINE | Admitting: INTERNAL MEDICINE
Payer: COMMERCIAL

## 2022-01-24 ENCOUNTER — LAB (OUTPATIENT)
Dept: LAB | Facility: CLINIC | Age: 22
End: 2022-01-24
Payer: COMMERCIAL

## 2022-01-24 DIAGNOSIS — R13.13 PHARYNGEAL DYSPHAGIA: ICD-10-CM

## 2022-01-24 DIAGNOSIS — R79.89 LOW TSH LEVEL: ICD-10-CM

## 2022-01-24 DIAGNOSIS — Z11.8 SPECIAL SCREENING EXAMINATION FOR CHLAMYDIAL DISEASE: ICD-10-CM

## 2022-01-24 DIAGNOSIS — E01.0 THYROMEGALY: ICD-10-CM

## 2022-01-24 PROCEDURE — 76536 US EXAM OF HEAD AND NECK: CPT

## 2022-01-24 PROCEDURE — 87491 CHLMYD TRACH DNA AMP PROBE: CPT

## 2022-01-25 LAB — C TRACH DNA SPEC QL NAA+PROBE: NEGATIVE

## 2022-01-27 ENCOUNTER — TELEPHONE (OUTPATIENT)
Dept: FAMILY MEDICINE | Facility: CLINIC | Age: 22
End: 2022-01-27
Payer: COMMERCIAL

## 2022-01-27 NOTE — TELEPHONE ENCOUNTER
----- Message from Zarina Zavaleta MD sent at 1/26/2022  4:54 PM CST -----  Your Thyroid Ultrasound is positive for nodules which are appearing benign. I have placed referral to Endocrinology specialist for further recommendations. Please keep up your appointment with them.     Thank you,  Zarina Zavaleta MD on 1/26/2022 at 4:54 PM

## 2022-02-09 NOTE — PATIENT INSTRUCTIONS
"    Answers for HPI/ROS submitted by the patient on 2/9/2022  In general, how would you rate your overall physical health?: good  Frequency of exercise:: 6-7 days/week  Do you usually eat at least 4 servings of fruit and vegetables a day, include whole grains & fiber, and avoid regularly eating high fat or \"junk\" foods? : Yes  Taking medications regularly:: Yes  Medication side effects:: None  Activities of Daily Living: no assistance needed  Home safety: no safety concerns identified  Hearing Impairment:: difficulty following a conversation in a noisy restaurant or crowded room, feel that people are mumbling or not speaking clearly, difficulty following dialogue in the theater, difficult to understand a speaker at a public meeting or [employer] service, need to ask people to speak up or repeat themselves, find that men's voices are easier to understand than woman's, difficulty understanding soft or whispered speech, difficulty understanding speech on the telephone  In the past 6 months, have you been bothered by leaking of urine?: No  abdominal pain: No  Blood in stool: No  Blood in urine: No  chest pain: No  chills: No  congestion: No  constipation: No  cough: No  diarrhea: No  dizziness: No  ear pain: No  eye pain: No  nervous/anxious: No  fever: No  frequency: No  genital sores: No  headaches: No  hearing loss: Yes  heartburn: No  arthralgias: No  joint swelling: No  peripheral edema: No  mood changes: No  myalgias: No  nausea: No  dysuria: No  palpitations: No  Skin sensation changes: No  sore throat: No  urgency: No  rash: No  shortness of breath: No  visual disturbance: No  weakness: No  impotence: Yes  In general, how would you rate your overall mental or emotional health?: good  Additional concerns today:: Yes  Duration of exercise:: 30-45 minutes    Reyes Hinkle MD    " FUTURE APPOINTMENTS  Follow up ion 3 months as needed.    Bedtime :    Before going to bed, apply topical Tretinoin 0.025% cream.    Wait until face is dry after cleansing before application.    Use just a pea-sized amount for application.    Alternate application of tretinoin every other night for 2 weeks to condition the skin. After 2 weeks, try nightly application.     None

## 2022-02-22 ENCOUNTER — TELEPHONE (OUTPATIENT)
Dept: FAMILY MEDICINE | Facility: CLINIC | Age: 22
End: 2022-02-22
Payer: COMMERCIAL

## 2022-02-22 NOTE — TELEPHONE ENCOUNTER
Patient tested positive via rapid nasal test at home on 2/20/22, symptoms beginning 2/18/22.     Patient is still symptomatic, having headache, chills, body ache, runny nose.

## 2022-02-22 NOTE — TELEPHONE ENCOUNTER
Please inquire where she got the test done?  If done at the facility, they should be the ones giving her the school excuse.  I do not see the results in epic.    Rick Reeves MD  [facility], [place]

## 2022-02-22 NOTE — TELEPHONE ENCOUNTER
Zari's mother calling to request a letter to excuse her from school. Zari tested positive for Covid on Sunday, Feb 20. Please excuse her from school and through Saturday, Feb 26. Any questions, mom can be reached at [phone]. Pt will  the letter at the . Thank you.  Maritza Cruz,

## 2022-02-22 NOTE — LETTER
[facility]          [address] [place] [address]                            [phone]  Fax: [phone]    Zari Luke  [address]  [facility] [address]    [id number]    February 23, 2022      To whom it may concern    Please excuse Zari Luke from school on 2/21/22 through 2/25/22 due to illness.  If you have any other questions or concerns please feel free to contact me at anytime.        Sincerely,      Leandro Cabrera M.D.

## 2022-02-23 NOTE — TELEPHONE ENCOUNTER
Please print the letter and give to the patient    Rick Reeves MD  [facility], [place]

## 2022-03-06 ENCOUNTER — HEALTH MAINTENANCE LETTER (OUTPATIENT)
Age: 22
End: 2022-03-06

## 2022-03-10 ENCOUNTER — TELEPHONE (OUTPATIENT)
Dept: NEUROLOGY | Facility: CLINIC | Age: 22
End: 2022-03-10
Payer: COMMERCIAL

## 2022-04-06 ENCOUNTER — NURSE TRIAGE (OUTPATIENT)
Dept: NURSING | Facility: CLINIC | Age: 22
End: 2022-04-06
Payer: COMMERCIAL

## 2022-04-06 ENCOUNTER — HOSPITAL ENCOUNTER (EMERGENCY)
Facility: CLINIC | Age: 22
Discharge: HOME | End: 2022-04-06
Attending: PHYSICIAN ASSISTANT | Admitting: PHYSICIAN ASSISTANT
Payer: COMMERCIAL

## 2022-04-06 VITALS
RESPIRATION RATE: 16 BRPM | TEMPERATURE: 98.2 F | DIASTOLIC BLOOD PRESSURE: 68 MMHG | OXYGEN SATURATION: 99 % | SYSTOLIC BLOOD PRESSURE: 101 MMHG | HEART RATE: 72 BPM

## 2022-04-06 DIAGNOSIS — R10.2 SUPRAPUBIC ABDOMINAL PAIN: ICD-10-CM

## 2022-04-06 DIAGNOSIS — R35.0 URINARY FREQUENCY: ICD-10-CM

## 2022-04-06 LAB
ALBUMIN UR-MCNC: NEGATIVE MG/DL
APPEARANCE UR: CLEAR
BILIRUB UR QL STRIP: NEGATIVE
COLOR UR AUTO: NORMAL
GLUCOSE UR STRIP-MCNC: NEGATIVE MG/DL
HGB UR QL STRIP: NEGATIVE
KETONES UR STRIP-MCNC: NEGATIVE MG/DL
LEUKOCYTE ESTERASE UR QL STRIP: NEGATIVE
NITRATE UR QL: NEGATIVE
PH UR STRIP: 6.5 [PH] (ref 5–7)
SP GR UR STRIP: 1.02 (ref 1–1.03)
UROBILINOGEN UR STRIP-MCNC: NORMAL MG/DL

## 2022-04-06 PROCEDURE — 99283 EMERGENCY DEPT VISIT LOW MDM: CPT

## 2022-04-06 PROCEDURE — 81003 URINALYSIS AUTO W/O SCOPE: CPT | Performed by: PHYSICIAN ASSISTANT

## 2022-04-06 ASSESSMENT — ENCOUNTER SYMPTOMS
FREQUENCY: 1
DYSURIA: 0
HEMATURIA: 0
BACK PAIN: 1
ABDOMINAL PAIN: 1
FEVER: 0
CHILLS: 0

## 2022-04-06 NOTE — TELEPHONE ENCOUNTER
Zari calls and says that she has lower abdominal pain and frequency of urination. Symptoms began on 4/2/2022. Afebrile. Pt. Says that she cannot see her DrRenaldo For weeks and will go to an  clinic. COVID 19 Nurse Triage Plan/Patient Instructions    Please be aware that novel coronavirus (COVID-19) may be circulating in the community. If you develop symptoms such as fever, cough, or SOB or if you have concerns about the presence of another infection including coronavirus (COVID-19), please contact your health care provider or visit [email or web address].     Disposition/Instructions    In-Person Visit with provider recommended. Reference Visit Selection Guide.    Thank you for taking steps to prevent the spread of this virus.  o Limit your contact with others.  o Wear a simple mask to cover your cough.  o Wash your hands well and often.    Resources    [facility]: About COVID-19: [email or web address]    CDC: What to Do If You're Sick: www.cdc.gov/coronavirus/2019-ncov/about/steps-when-sick.html    CDC: Ending Home Isolation: www.cdc.gov/coronavirus/2019-ncov/hcp/disposition-in-home-patients.html     CDC: Caring for Someone: www.cdc.gov/coronavirus/2019-ncov/if-you-are-sick/care-for-someone.html     [facility]: Interim Guidance for Hospital Discharge to Home: www.health.[facility].mn.us/diseases/coronavirus/hcp/hospdischarge.pdf    [facility] clinical trials (COVID-19 research studies): [email or web address]/n-clinical-trials     Below are the COVID-19 hotlines at the [place] Department of Health ([facility]). Interpreters are available.   o For health questions: Call 305-776-1492 or 1-755.395.6702 (7 a.m. to 7 p.m.)  o For questions about schools and childcare: Call 391-242-8700 or 1-222.419.3027 (7 a.m. to 7 p.m.)                   Reason for Disposition    Side (flank) or lower back pain present    Additional Information    Negative: Shock suspected (e.g., cold/pale/clammy skin, too weak to  stand, low BP, rapid pulse)    Negative: Sounds like a life-threatening emergency to the triager    Negative: Followed a genital area injury    Negative: Followed a genital area injury (penis, scrotum)    Negative: Vaginal discharge    Negative: Pus (white, yellow) or bloody discharge from end of penis    Negative: [1] Taking antibiotic for urinary tract infection (UTI) AND [2] female    Negative: [1] Taking antibiotic for urinary tract infection (UTI) AND [2] male    Negative: [1] Discomfort (pain, burning or stinging) when passing urine AND [2] pregnant    Negative: [1] Discomfort (pain, burning or stinging) when passing urine AND [2] postpartum (from 0 to 6 weeks after delivery)    Negative: [1] Discomfort (pain, burning or stinging) when passing urine AND [2] female    Negative: [1] Discomfort (pain, burning or stinging) when passing urine AND [2] male    Negative: Pain or itching in the vulvar area    Negative: Pain in scrotum is main symptom    Negative: Blood in the urine is main symptom    Negative: Symptoms arising from use of a urinary catheter ([place]entz or Coude)    Negative: [1] Unable to urinate (or only a few drops) > 4 hours AND     [2] bladder feels very full (e.g., palpable bladder or strong urge to urinate)    Negative: [1] Decreased urination and [2] drinking very little AND [2] dehydration suspected (e.g., dark urine, no urine > 12 hours, very dry mouth, very lightheaded)    Negative: Patient sounds very sick or weak to the triager    Negative: Fever > 100.4 F (38.0 C)    Protocols used: URINARY SYMPTOMS-A-AH

## 2022-04-06 NOTE — ED PROVIDER NOTES
History   Chief Complaint:  Urinary Frequency and Abdominal Pain       The history is provided by the patient.      Zari Luke is a 22 year old female who presents with urinary frequency and abdominal pain. This past Friday she started having increased urine frequency and lower abdominal and lower back pain. Denies dysuria and blood in urine. No history of UTI. No concerns for pregnancy. Denies having abnormal vaginal discharge. Denies fever and chills.     Review of Systems   Constitutional: Negative for chills and fever.   Gastrointestinal: Positive for abdominal pain.   Genitourinary: Positive for frequency. Negative for dysuria and hematuria.   Musculoskeletal: Positive for back pain.   All other systems reviewed and are negative.      Allergies:  Compazine [Prochlorperazine]    Medications:  famotidine   nortriptyline   ondansetron   Sumatriptan     Past Medical History:     Chronic tonsillitis  Mononucleosis    Past Surgical History:    Tonsillectomy     Family History:    Heart disease   Diabetes     Social History:  Presents alone    Physical Exam     Patient Vitals for the past 24 hrs:   BP Temp Temp src Pulse Resp SpO2   04/06/22 1829 101/68 98.2  F (36.8  C) Temporal 72 16 99 %       Physical Exam  Constitutional: Pleasant. Cooperative.   Eyes: Pupils equally round and reactive  HENT: Head is normal in appearance. Oropharynx is normal with moist mucus membranes.  Cardiovascular: Regular rate and rhythm and without murmurs.  Respiratory: Normal respiratory effort, lungs are clear bilaterally.  GI: Mild suprapubic TTP, otherwise non-tender, soft, non-distended. No guarding, rebound, or rigidity.  Musculoskeletal: No asymmetry of the lower extremities, no tenderness to palpation. No CVA TTP.  Skin: Normal, without rash.  Neurologic: Cranial nerves grossly intact, normal cognition, no focal deficits. Alert and oriented x 3.   Psychiatric: Normal affect.  Nursing notes and vital signs  reviewed.       Emergency Department Course   Laboratory:  Labs Ordered and Resulted from Time of ED Arrival to Time of ED Departure   URINE MACROSCOPIC WITH REFLEX TO MICRO - Normal       Result Value    Color Urine Light Yellow      Appearance Urine Clear      Glucose Urine Negative      Bilirubin Urine Negative      Ketones Urine Negative      Specific Gravity Urine 1.018      Blood Urine Negative      pH Urine 6.5      Protein Albumin Urine Negative      Urobilinogen Urine Normal      Nitrite Urine Negative      Leukocyte Esterase Urine Negative          Emergency Department Course:    Reviewed:  I reviewed nursing notes, vitals, past medical history and Care Everywhere    Assessments:  1837 I obtained history and examined the patient as noted above.   1925 I rechecked the patient and explained findings.     Disposition:  The patient was discharged to home.     Impression & Plan     Medical Decision Making:  Zari Luke is a 22 year old female who presents to emergency department for evaluation of urinary frequency, suprapubic abdominal pain, and low back pain.  See HPI as above for additional details.  Vitals and physical exam as above.  Differential is broad and included UTI, pyelonephritis, ectopic pregnancy, ovarian cyst, fibroid, vaginitis, above others.  UA obtained as above negative for evidence for infection.  Patient denies chance of pregnancy and declines pregnancy test.  Patient declined  exam, declines concern for vaginal etiology.  Do not feel that ultrasound or CT was indicated in this setting, as primary reason for presentation was frequency.  Discussed with patient the unclear etiology of her symptoms at this time.  No glucosuria to suggest for new onset diabetes.  Will provide her OB referral with persistent symptoms. Discussed reasons to return. All questions answered. Patient discharged to home in stable condition.    Diagnosis:    ICD-10-CM    1. Urinary frequency  R35.0    2.  Suprapubic abdominal pain  R10.2        Discharge Medications:  New Prescriptions    No medications on file       Scribe Disclosure:  I, Sakshi Dimas, am serving as a scribe at 6:42 PM on 4/6/2022 to document services personally performed by Ankit Mcgee PA-C based on my observations and the provider's statements to me.     This record was created at least in part using electronic voice recognition software, so please excuse any typographical errors.           Ankit Mcgee PA-C  04/06/22 1946

## 2022-04-07 ENCOUNTER — PATIENT OUTREACH (OUTPATIENT)
Dept: FAMILY MEDICINE | Facility: CLINIC | Age: 22
End: 2022-04-07
Payer: COMMERCIAL

## 2022-04-07 NOTE — DISCHARGE INSTRUCTIONS
The cause of your symptoms is unclear at this time. If you develop worsening symptoms, fevers, vaginal discharge, return to the emergency department for further evaluation.

## 2022-04-07 NOTE — TELEPHONE ENCOUNTER
What type of discharge? Emergency Department  Risk of Hospital admission or ED visit: 52%  Is a TCM episode required? No  When should the patient follow up with PCP? 7 days of discharge.    Keira Barron RN  [facility]

## 2022-04-07 NOTE — TELEPHONE ENCOUNTER
"ED/Discharge Protocol    \"Hi, my name is Maye Minaya RN, a registered nurse, and I am calling on behalf of Dr. Reeves's office at [place].  I am calling to follow up and see how things are going for you after your recent visit.\"    \"I see that you were in the (ER/UC/IP) on 4/6/22.    How are you doing now that you are home?\" Doing good.     Is patient experiencing symptoms that may require a hospital visit?  No    Discharge Instructions    \"Let's review your discharge instructions.  What is/are the follow-up recommendations?  Pt. Response: Make an appointment with OBGYN.     \"Were you instructed to make a follow-up appointment?\"  Pt. Response: No. Was not with PCP just OB.     \"When you see the provider, I would recommend that you bring your discharge instructions with you.    Medications    \"How many new medications are you on since your hospitalization/ED visit?\"    0-1  \"How many of your current medicines changed (dose, timing, name, etc.) while you were in the hospital/ED visit?\"   0-1  \"Do you have questions about your medications?\"   No  \"Were you newly diagnosed with heart failure, COPD, diabetes or did you have a heart attack?\"   No  For patients on insulin: \"Did you start on insulin in the hospital or did you have your insulin dose changed?\"   No  Post Discharge Medication Reconciliation Status: discharge medications reconciled and changed, per note/orders.  Pt states she is not currently taking any medication. Only taking the Tretinoin at this time.     Was MTM referral placed (*Make sure to put transitions as reason for referral)?   No    Call Summary    \"Do you have any questions or concerns about your condition or care plan at the moment?\"    No  Triage nurse advice given: If pt experiences any new or concerning symptoms to call the clinic triage    Patient was in ER twice in the past year (assess appropriateness of ER visits.)      \"If you have questions or things don't continue to improve, we " "encourage you contact us through the main clinic number,  [phone].  Even if the clinic is not open, triage nurses are available 24/7 to help you.     We would like you to know that our clinic has extended hours (provide information).  We also have urgent care (provide details on closest location and hours/contact info)\"      \"Thank you for your time and take care!\"    Maye DAVIS RN  [facility]   "

## 2022-04-07 NOTE — TELEPHONE ENCOUNTER
Pt states she is not currently taking any medication. Only taking the Tretinoin at this time. Please review medication list and change as you feel appropriate.     Maye DAVIS RN  [facility]

## 2022-05-11 ENCOUNTER — OFFICE VISIT (OUTPATIENT)
Dept: OTOLARYNGOLOGY | Facility: CLINIC | Age: 22
End: 2022-05-11
Payer: COMMERCIAL

## 2022-05-11 VITALS — RESPIRATION RATE: 16 BRPM | HEART RATE: 72 BPM | OXYGEN SATURATION: 99 %

## 2022-05-11 DIAGNOSIS — R09.A2 GLOBUS SENSATION: Primary | ICD-10-CM

## 2022-05-11 DIAGNOSIS — K21.9 LPRD (LARYNGOPHARYNGEAL REFLUX DISEASE): ICD-10-CM

## 2022-05-11 PROCEDURE — 31575 DIAGNOSTIC LARYNGOSCOPY: CPT | Performed by: OTOLARYNGOLOGY

## 2022-05-11 PROCEDURE — 99203 OFFICE O/P NEW LOW 30 MIN: CPT | Mod: 25 | Performed by: OTOLARYNGOLOGY

## 2022-05-11 NOTE — PROGRESS NOTES
Chief Complaint - foreign body sensation in throat    History of Present Illness - Zari Luke is a 22 year old female who presents with a history of feeling like something is in the back of the throat since 10/2021. She gets this feeling something is in the throat so she throws up. She feels throwing up makes it worse. She doesn't feel nauseous, but rather it is due to the weird feeling in the throat. Voicing has seemed normal. The patient denies any recent intubations or throat procedures. She describes dysphagia, but it is intermittent. Even water sometimes. Breathing is okay. They note no history of relux. She has tried acid reflux medication only as needed. Ultrasound thyroid 1/24/22 showed a few sub 5 mm nodules.    Past Medical History -   Patient Active Problem List   Diagnosis     Episodic tension-type headache, not intractable     Fatigue, unspecified type     Menorrhagia with regular cycle     Neck pain     Daily headache     Cervical pain     Tension headache     Contraceptive management     Nonintractable episodic headache, unspecified headache type       Current Medications -   Current Outpatient Medications:      famotidine (PEPCID) 20 MG tablet, Take 1 tablet (20 mg) by mouth 2 times daily as needed (Nausea and Vomiting), Disp: 60 tablet, Rfl: 1     nortriptyline (PAMELOR) 25 MG capsule, Take 1 capsule (25 mg) by mouth At Bedtime, Disp: 30 capsule, Rfl: 4     ondansetron (ZOFRAN-ODT) 4 MG ODT tab, Take 1 tablet (4 mg) by mouth every 8 hours as needed for vomiting, Disp: 18 tablet, Rfl: 1     PFIZER-BIONTIndochino COVID-19 VACC 30 MCG/0.3ML injection, , Disp: , Rfl:      SUMAtriptan (IMITREX) 25 MG tablet, Take 1 tablet (25 mg) by mouth at onset of headache for migraine May repeat in 2 hours. Max 8 tablets/24 hours., Disp: 9 tablet, Rfl: 2     tretinoin (RETIN-A) 0.025 % external cream, Apply topically At Bedtime, Disp: 45 g, Rfl: 1    Allergies -   Allergies   Allergen Reactions     Compazine  [Prochlorperazine] Other (See Comments)     Lock jaw       Social History -   Social History     Socioeconomic History     Marital status: Single   Tobacco Use     Smoking status: Never Smoker     Smokeless tobacco: Never Used   Substance and Sexual Activity     Alcohol use: No     Alcohol/week: 0.0 standard drinks     Drug use: No     Sexual activity: Never   Social History Narrative    Student, going to 7th grade, has two siblings and parents        Family History -   Family History   Problem Relation Age of Onset     Heart Disease Maternal Grandfather      Diabetes No family hx of      Lipids No family hx of      Review of Systems - As per HPI and PMHx, otherwise 7 system review of the head and neck is negative.    Physical Exam  Pulse 72   Resp 16   SpO2 99%   General - The patient is in no distress. Alert and oriented to person and place, answers questions and cooperates with examination appropriately.   Voice and Breathing - The patient was breathing comfortably without the use of accessory muscles. There was no wheezing, stridor, or stertor.  The patients voice was clear and strong.  Eyes - Extraocular movements intact.  Sclera were not icteric or injected, conjunctiva were pink and moist.  Mouth - Examination of the oral cavity showed pink, healthy oral mucosa. No lesions or ulcerations noted.  The tongue was mobile and midline.  Throat - The walls of the oropharynx were smooth, symmetric, and had no lesions or ulcerations.  The tonsillar pillars and soft palate were symmetric.  The uvula was midline on elevation. Tonsils absent.   Nose - External contour is symmetric, no gross deflection or scars.  Nasal mucosa is pink and moist with no abnormal mucus.  The septum was midline and non-obstructive, turbinates of normal size and position.  No polyps, masses, or purulence noted on examination.  Neck -  Soft, non-tender. Palpation of the occipital, submental, submandibular, internal jugular chain, and  supraclavicular nodes did not demonstrate any abnormal lymph nodes or masses. No parotid masses. Palpation of the thyroid was soft and smooth, with no nodules or goiter appreciated.  The trachea was mobile and midline.  Neurologic - CN II-XII are grossly intact, no focal neurologic deficits.     Procedure: Flexible Endoscopy  Indication: Globus Sensation    Attempts at mirror laryngoscopy could not be performed due to gag reflex and patient anatomy. To best visualize the upper airway anatomy and due to the chief complaint and HPI, I proceeded with a fiberoptic examination. Color photographs were taken for the medical record. First I sprayed the right side of the nose with a mixture of lidocaine and neosynephrine.  I then passed the scope through the nasal cavity.  The nasal cavity was unremarkable.  The nasopharynx was mucosally covered and symmetric.  The Eustachian tube openings were unobstructed.  Going further down I had a clear view of the base of tongue which had normal appearing lingual tonsillar tissue.  The base of tongue was free of lesions, masses, and the vallecula was open.  The epiglottis was smooth and mucosally covered.  The supraglottic larynx was then clearly visualized. There was some interarytenoid edema consistent with reflux and/or her vomiting. The vocal cords moved smoothly and symmetrically, they were pearly white and no lesions were seen.  The pyriform sinuses were open, and the limited view of the postcricoid region did not show any lesions.                              A/P - Zari Luke is a 22 year old female with globus sensation. This causes her to vomit, which seems self-induced. She denies an eating disorder, and does it to try and get the feeling out of her throat.  She does have some interarytenoid and postcricoid area edema quite possibly from the vomiting, but then also making the globus worse.  I think the most likely etiology is laryngopharyngeal reflux. I have started PPIs  (prilosec 20 mg daily) and provided counseling on lifestyle changes including avoidance of certain foods, sleeping on an incline, and avoiding lying down within 3-4 hours after eating. Recheck in 6 weeks.  If she is still struggling with this I recommend an EGD.    Adult lifestyle changes to prevent LPR reviewed      Avoid eating and drinking within two to three hours prior to bedtime    Do not drink alcohol    Eat small meals and slowly    Limit problem foods:    o Caffeine  o Carbonated drinks  o Chocolate  o Peppermint  o Tomato  o Citrus fruits  o Fatty and fried foods      Lose weight    Quit smoking    Wear loose clothing      Edgar Tomas MD  Otolaryngology  [facility]

## 2022-05-11 NOTE — LETTER
5/11/2022         RE: Zari Luke  [address]  [place] [address]        Dear Colleague,    Thank you for referring your patient, Zari Luke, to the [facility]. Please see a copy of my visit note below.    Chief Complaint - foreign body sensation in throat    History of Present Illness - Zari Luke is a 22 year old female who presents with a history of feeling like something is in the back of the throat since 10/2021. She gets this feeling something is in the throat so she throws up. She feels throwing up makes it worse. She doesn't feel nauseous, but rather it is due to the weird feeling in the throat. Voicing has seemed normal. The patient denies any recent intubations or throat procedures. She describes dysphagia, but it is intermittent. Even water sometimes. Breathing is okay. They note no history of relux. She has tried acid reflux medication only as needed. Ultrasound thyroid 1/24/22 showed a few sub 5 mm nodules.    Past Medical History -   Patient Active Problem List   Diagnosis     Episodic tension-type headache, not intractable     Fatigue, unspecified type     Menorrhagia with regular cycle     Neck pain     Daily headache     Cervical pain     Tension headache     Contraceptive management     Nonintractable episodic headache, unspecified headache type       Current Medications -   Current Outpatient Medications:      famotidine (PEPCID) 20 MG tablet, Take 1 tablet (20 mg) by mouth 2 times daily as needed (Nausea and Vomiting), Disp: 60 tablet, Rfl: 1     nortriptyline (PAMELOR) 25 MG capsule, Take 1 capsule (25 mg) by mouth At Bedtime, Disp: 30 capsule, Rfl: 4     ondansetron (ZOFRAN-ODT) 4 MG ODT tab, Take 1 tablet (4 mg) by mouth every 8 hours as needed for vomiting, Disp: 18 tablet, Rfl: 1     PFIZER-BIONTAspectiva COVID-19 VACC 30 MCG/0.3ML injection, , Disp: , Rfl:      SUMAtriptan (IMITREX) 25 MG tablet, Take 1 tablet (25 mg) by mouth at onset of  headache for migraine May repeat in 2 hours. Max 8 tablets/24 hours., Disp: 9 tablet, Rfl: 2     tretinoin (RETIN-A) 0.025 % external cream, Apply topically At Bedtime, Disp: 45 g, Rfl: 1    Allergies -   Allergies   Allergen Reactions     Compazine [Prochlorperazine] Other (See Comments)     Lock jaw       Social History -   Social History     Socioeconomic History     Marital status: Single   Tobacco Use     Smoking status: Never Smoker     Smokeless tobacco: Never Used   Substance and Sexual Activity     Alcohol use: No     Alcohol/week: 0.0 standard drinks     Drug use: No     Sexual activity: Never   Social History Narrative    Student, going to 7th grade, has two siblings and parents        Family History -   Family History   Problem Relation Age of Onset     Heart Disease Maternal Grandfather      Diabetes No family hx of      Lipids No family hx of      Review of Systems - As per HPI and PMHx, otherwise 7 system review of the head and neck is negative.    Physical Exam  Pulse 72   Resp 16   SpO2 99%   General - The patient is in no distress. Alert and oriented to person and place, answers questions and cooperates with examination appropriately.   Voice and Breathing - The patient was breathing comfortably without the use of accessory muscles. There was no wheezing, stridor, or stertor.  The patients voice was clear and strong.  Eyes - Extraocular movements intact.  Sclera were not icteric or injected, conjunctiva were pink and moist.  Mouth - Examination of the oral cavity showed pink, healthy oral mucosa. No lesions or ulcerations noted.  The tongue was mobile and midline.  Throat - The walls of the oropharynx were smooth, symmetric, and had no lesions or ulcerations.  The tonsillar pillars and soft palate were symmetric.  The uvula was midline on elevation. Tonsils absent.   Nose - External contour is symmetric, no gross deflection or scars.  Nasal mucosa is pink and moist with no abnormal mucus.  The  septum was midline and non-obstructive, turbinates of normal size and position.  No polyps, masses, or purulence noted on examination.  Neck -  Soft, non-tender. Palpation of the occipital, submental, submandibular, internal jugular chain, and supraclavicular nodes did not demonstrate any abnormal lymph nodes or masses. No parotid masses. Palpation of the thyroid was soft and smooth, with no nodules or goiter appreciated.  The trachea was mobile and midline.  Neurologic - CN II-XII are grossly intact, no focal neurologic deficits.     Procedure: Flexible Endoscopy  Indication: Globus Sensation    Attempts at mirror laryngoscopy could not be performed due to gag reflex and patient anatomy. To best visualize the upper airway anatomy and due to the chief complaint and HPI, I proceeded with a fiberoptic examination. Color photographs were taken for the medical record. First I sprayed the right side of the nose with a mixture of lidocaine and neosynephrine.  I then passed the scope through the nasal cavity.  The nasal cavity was unremarkable.  The nasopharynx was mucosally covered and symmetric.  The Eustachian tube openings were unobstructed.  Going further down I had a clear view of the base of tongue which had normal appearing lingual tonsillar tissue.  The base of tongue was free of lesions, masses, and the vallecula was open.  The epiglottis was smooth and mucosally covered.  The supraglottic larynx was then clearly visualized. There was some interarytenoid edema consistent with reflux and/or her vomiting. The vocal cords moved smoothly and symmetrically, they were pearly white and no lesions were seen.  The pyriform sinuses were open, and the limited view of the postcricoid region did not show any lesions.                              A/P - Zari Luke is a 22 year old female with globus sensation. This causes her to vomit, which seems self-induced. She denies an eating disorder, and does it to try and get the  feeling out of her throat.  She does have some interarytenoid and postcricoid area edema quite possibly from the vomiting, but then also making the globus worse.  I think the most likely etiology is laryngopharyngeal reflux. I have started PPIs (prilosec 20 mg daily) and provided counseling on lifestyle changes including avoidance of certain foods, sleeping on an incline, and avoiding lying down within 3-4 hours after eating. Recheck in 6 weeks.  If she is still struggling with this I recommend an EGD.    Adult lifestyle changes to prevent LPR reviewed      Avoid eating and drinking within two to three hours prior to bedtime    Do not drink alcohol    Eat small meals and slowly    Limit problem foods:    o Caffeine  o Carbonated drinks  o Chocolate  o Peppermint  o Tomato  o Citrus fruits  o Fatty and fried foods      Lose weight    Quit smoking    Wear loose clothing      Edgar Tomas MD  Otolaryngology  [facility]        Again, thank you for allowing me to participate in the care of your patient.        Sincerely,        Edgar Tomas MD

## 2022-05-19 ENCOUNTER — VIRTUAL VISIT (OUTPATIENT)
Dept: FAMILY MEDICINE | Facility: CLINIC | Age: 22
End: 2022-05-19
Payer: COMMERCIAL

## 2022-05-19 DIAGNOSIS — E04.1 THYROID NODULE: ICD-10-CM

## 2022-05-19 DIAGNOSIS — K21.9 GASTROESOPHAGEAL REFLUX DISEASE, UNSPECIFIED WHETHER ESOPHAGITIS PRESENT: ICD-10-CM

## 2022-05-19 DIAGNOSIS — R63.0 LOSS OF APPETITE: Primary | ICD-10-CM

## 2022-05-19 DIAGNOSIS — Z11.4 SCREENING FOR HIV (HUMAN IMMUNODEFICIENCY VIRUS): ICD-10-CM

## 2022-05-19 DIAGNOSIS — Z11.59 NEED FOR HEPATITIS C SCREENING TEST: ICD-10-CM

## 2022-05-19 PROCEDURE — 99441 PR PHYSICIAN TELEPHONE EVALUATION 5-10 MIN: CPT | Mod: 95 | Performed by: INTERNAL MEDICINE

## 2022-05-19 NOTE — PATIENT INSTRUCTIONS
As discussed, your loss of apetite symptoms on this telephone visit and the past Thyroid enlargement seen on your office visit with me and thyroid Ultrasound showing thyroid nodules I await for the baseline labs to be done , which were ordered by me in 1/2022 and not done yet. Please make LAB only appointment ( no need of fasting ) and get these labs done before I can do further recommendations.   Continue current Omeprazole as recommended by ENT on your symptoms of vomitings after they did the work up.   You will need to inform us on pregnancy , if you have missed periods since you are not on any contraception and will need to check pregnancy test at that time .    ===============

## 2022-05-19 NOTE — PROGRESS NOTES
Zari is a 22 year old who is being evaluated via a billable telephone visit.      What phone number would you like to be contacted at? [phone]   How would you like to obtain your AVS? MyChart    Assessment and Plan  1. Loss of appetite  2. Thyroid nodule  New problem, differential diagnosis of possible thyroid metabolic abnormalities given her thyromegaly on physical exam in 1/2022 , Thyroid USG positive for thyroid nodule at that time. Pt never did the labs ordered on that last visit including Thyroid functio. Please see AVS below for details. Will add additional labs of CMP to make sure no other abnormalities. Pt understood and agreed with the plan.   - Comprehensive metabolic panel (BMP + Alb, Alk Phos, ALT, AST, Total. Bili, TP); Future    3. Gastroesophageal reflux disease, unspecified whether esophagitis present  Chronic N. Vomitings , dysphagia for which she was referred to ENT and pt was worked up extensively with Video laryngoscopy. Pictures reviewed , as per ENT this is GERD and not responding to Pepcid started by me in the past given pt child bearing age. She was started on PPI by ENT.     4. Screening for HIV (human immunodeficiency virus)  - HIV Antigen Antibody Combo; Future    5. Need for hepatitis C screening test  - Hepatitis C Screen Reflex to HCV RNA Quant and Genotype; Future       Patient Instructions   As discussed, your loss of apetite symptoms on this telephone visit and the past Thyroid enlargement seen on your office visit with me and thyroid Ultrasound showing thyroid nodules I await for the baseline labs to be done , which were ordered by me in 1/2022 and not done yet. Please make LAB only appointment ( no need of fasting ) and get these labs done before I can do further recommendations.   Continue current Omeprazole as recommended by ENT on your symptoms of vomitings after they did the work up.   You will need to inform us on pregnancy , if you have missed periods since you are not  on any contraception and will need to check pregnancy test at that time .    ===============                  Return if symptoms worsen or fail to improve.    Zarina Zavaleta MD  [facility] LEW Hameed is a 22 year old who presents for the following health issues       HPI     Concern - no appetite   Onset: about month   Description: pt been having lack of appetite   Intensity: mild  Progression of Symptoms:  Worsening   Accompanying Signs & Symptoms:   Previous history of similar problem:   Precipitating factors:        Worsened by:   Alleviating factors:        Improved by:   Therapies tried and outcome:  none     C/O Loss of appetite . - Denies diarrhea, constipation or abdominal pain. No nausea or vomitings.   Pt not on contraceptive and LMP 5/4/2022. Do not suspect pregnancy.  Pt has been having pharyngeal dysphagia for which she has underwent Laryngoscopy and was started on PPI .         Allergies   Allergen Reactions     Compazine [Prochlorperazine] Other (See Comments)     Lock jaw        Past Medical History:   Diagnosis Date     Chronic tonsillitis 2005    s/p tonsillectomy     Left forearm fracture 2009     Mononucleosis 10/2014       Past Surgical History:   Procedure Laterality Date     TONSILLECTOMY  2005       Family History   Problem Relation Age of Onset     Heart Disease Maternal Grandfather      Diabetes No family hx of      Lipids No family hx of        Social History     Tobacco Use     Smoking status: Never Smoker     Smokeless tobacco: Never Used   Substance Use Topics     Alcohol use: No     Alcohol/week: 0.0 standard drinks        Current Outpatient Medications   Medication     omeprazole (PRILOSEC) 20 MG DR capsule     tretinoin (RETIN-A) 0.025 % external cream     famotidine (PEPCID) 20 MG tablet     nortriptyline (PAMELOR) 25 MG capsule     ondansetron (ZOFRAN-ODT) 4 MG ODT tab     PFIZER-BIONTECH COVID-19 VACC 30 MCG/0.3ML injection      SUMAtriptan (IMITREX) 25 MG tablet     No current facility-administered medications for this visit.          Review of Systems   Constitutional, HEENT, cardiovascular, pulmonary, GI, , musculoskeletal, neuro, skin, endocrine and psych systems are negative, except as otherwise noted.      Objective           Vitals:  No vitals were obtained today due to virtual visit.    Physical Exam   healthy, alert and no distress  PSYCH: Alert and oriented times 3; coherent speech, normal   rate and volume, able to articulate logical thoughts, able   to abstract reason, no tangential thoughts, no hallucinations   or delusions  Her affect is normal  RESP: No cough, no audible wheezing, able to talk in full sentences  Remainder of exam unable to be completed due to telephone visits        Phone call duration: 8 minutes

## 2022-10-18 ENCOUNTER — NURSE TRIAGE (OUTPATIENT)
Dept: NURSING | Facility: CLINIC | Age: 22
End: 2022-10-18

## 2022-10-18 NOTE — TELEPHONE ENCOUNTER
Reason for Disposition    Headache is a chronic symptom (recurrent or ongoing AND lasting > 4 weeks)    Additional Information    Negative: Difficult to awaken or acting confused (e.g., disoriented, slurred speech)    Negative: Weakness of the face, arm or leg on one side of the body and new-onset    Negative: Numbness of the face, arm or leg on one side of the body and new-onset    Negative: Loss of speech or garbled speech and new-onset    Negative: Passed out (i.e., fainted, collapsed and was not responding)    Negative: Sounds like a life-threatening emergency to the triager    Negative: Followed a head injury within last 3 days    Negative: Traumatic Brain Injury (TBI) is suspected    Negative: Sinus pain of forehead and yellow or green nasal discharge    Negative: Pregnant    Negative: Unable to walk without falling    Negative: Stiff neck (can't touch chin to chest)    Negative: Possibility of carbon monoxide exposure    Negative: SEVERE headache, states 'worst headache' of life    Negative: SEVERE headache, sudden-onset (i.e., reaching maximum intensity within seconds to 1 hour)    Negative: Severe pain in one eye    Negative: Loss of vision or double vision  (Exception: Same as prior migraines.)    Negative: Patient sounds very sick or weak to the triager    Negative: Fever > 103 F (39.4 C)    Negative: Fever > 100.0 F (37.8 C) and has diabetes mellitus or a weak immune system (e.g., HIV positive, cancer chemotherapy, organ transplant, splenectomy, chronic steroids)    Negative: SEVERE headache (e.g., excruciating) and has had severe headaches before    Negative: SEVERE headache and not relieved by pain meds    Negative: SEVERE headache and vomiting    Negative: SEVERE headache and fever    Negative: New headache and weak immune system (e.g., HIV positive, cancer chemo, splenectomy, organ transplant, chronic steroids)    Negative: Fever present > 3 days (72 hours)    Negative: Patient wants to be  "seen    Negative: Unexplained headache that is present > 24 hours    Negative: New headache and age > 50    Negative: Headache started during exertion (e.g., sex, strenuous exercise, heavy lifting)    Answer Assessment - Initial Assessment Questions  1. LOCATION: \"Where does it hurt?\"       Both sides of head, sometimes in the back  2. ONSET: \"When did the headache start?\" (Minutes, hours or days)       3 years  3. PATTERN: \"Does the pain come and go, or has it been constant since it started?\"      Comes and goes  4. SEVERITY: \"How bad is the pain?\" and \"What does it keep you from doing?\"  (e.g., Scale 1-10; mild, moderate, or severe)    - MILD (1-3): doesn't interfere with normal activities     - MODERATE (4-7): interferes with normal activities or awakens from sleep     - SEVERE (8-10): excruciating pain, unable to do any normal activities         Mild to moderate  5. RECURRENT SYMPTOM: \"Have you ever had headaches before?\" If Yes, ask: \"When was the last time?\" and \"What happened that time?\"       yes  6. CAUSE: \"What do you think is causing the headache?\"      Not sure, doctors tell her its from stress or from neck issues   7. MIGRAINE: \"Have you been diagnosed with migraine headaches?\" If Yes, ask: \"Is this headache similar?\"       maybe  8. HEAD INJURY: \"Has there been any recent injury to the head?\"       denies  9. OTHER SYMPTOMS: \"Do you have any other symptoms?\" (fever, stiff neck, eye pain, sore throat, cold symptoms)      no  10. PREGNANCY: \"Is there any chance you are pregnant?\" \"When was your last menstrual period?\"        no    Protocols used: HEADACHE-A-OH      "

## 2022-10-19 ENCOUNTER — VIRTUAL VISIT (OUTPATIENT)
Dept: FAMILY MEDICINE | Facility: CLINIC | Age: 22
End: 2022-10-19
Payer: COMMERCIAL

## 2022-10-19 DIAGNOSIS — K21.9 GASTROESOPHAGEAL REFLUX DISEASE, UNSPECIFIED WHETHER ESOPHAGITIS PRESENT: ICD-10-CM

## 2022-10-19 DIAGNOSIS — G44.219 EPISODIC TENSION-TYPE HEADACHE, NOT INTRACTABLE: Primary | ICD-10-CM

## 2022-10-19 DIAGNOSIS — G43.009 MIGRAINE WITHOUT AURA AND WITHOUT STATUS MIGRAINOSUS, NOT INTRACTABLE: ICD-10-CM

## 2022-10-19 DIAGNOSIS — R79.89 LOW TSH LEVEL: ICD-10-CM

## 2022-10-19 PROCEDURE — 99214 OFFICE O/P EST MOD 30 MIN: CPT | Mod: 95 | Performed by: INTERNAL MEDICINE

## 2022-10-19 RX ORDER — SUMATRIPTAN 25 MG/1
25 TABLET, FILM COATED ORAL
Qty: 9 TABLET | Refills: 2 | Status: SHIPPED | OUTPATIENT
Start: 2022-10-19 | End: 2024-01-18

## 2022-10-19 NOTE — PATIENT INSTRUCTIONS
As discussed , your symptoms are possibly related to metabolic causes which we will need to get them done before further recommendations.     Your symptoms most likely appears as Migraine and recommend to take Imitrex as needed since we cannot give NSAIDS due to GERD issues.     ======================

## 2022-10-19 NOTE — PROGRESS NOTES
Zari is a 22 year old who is being evaluated via a billable video visit.      How would you like to obtain your AVS? MyChart  If the video visit is dropped, the invitation should be resent by: Text to cell phone: [phone]  Will anyone else be joining your video visit? No    Assessment and Plan    1. Episodic tension-type headache, not intractable  Ongoing problem, which pt stopped using any of her pain relief medications. Will recommend to do the labs ordered and reordered again before we consider other causes. Will also need physical exam which pt will need to come for a physical emphasized. Plan mentioned as below for improvement.   - CBC with platelets and differential; Future  - Iron and iron binding capacity; Future  - Vitamin B12; Future    2. Migraine without aura and without status migrainosus, not intractable  Uncontrolled, pt has not been using Imitrex given in the past in 12/2021. Will refill her Imitrex.   - SUMAtriptan (IMITREX) 25 MG tablet; Take 1 tablet (25 mg) by mouth at onset of headache for migraine May repeat in 2 hours. Max 8 tablets/24 hours.  Dispense: 9 tablet; Refill: 2    3. Gastroesophageal reflux disease, unspecified whether esophagitis present  Chronic problem, on Omeprazole and helping her    - Iron and iron binding capacity; Future  - CBC with platelets and differential; Future  - Comprehensive metabolic panel (BMP + Alb, Alk Phos, ALT, AST, Total. Bili, TP); Future    4. Low TSH level  - TSH with free T4 reflex; Future       Patient Instructions   As discussed , your symptoms are possibly related to metabolic causes which we will need to get them done before further recommendations.     Your symptoms most likely appears as Migraine and recommend to take Imitrex as needed since we cannot give NSAIDS due to GERD issues.     ======================    Return in about 4 weeks (around 11/16/2022), or if symptoms worsen or fail to improve, for Preventative Visit.    Zarina Zavaleta,  MD [facility] LEW Hameed is a 22 year old, presenting for the following health issues:  Headache      HPI     Concern - headaches  Onset: ongoing  Description: very forgetful, memory loss (more than normal), hearing sounds that were not there  Intensity: moderate  Progression of Symptoms:  waxing and waning  Accompanying Signs & Symptoms: sensitivity to light and sound  Previous history of similar problem: ongoing  Precipitating factors:        Worsened by: staring at screens, sunlight  Alleviating factors:       Improved by: medications  Therapies tried and outcome: OTC IBU and advil    Last seen patient in 5/2022 for Virtual visit . She is supposed to do the lab works ordered in the past which she couldn't make any lab appointment till now.        Allergies   Allergen Reactions     Compazine [Prochlorperazine] Other (See Comments)     Lock jaw        Past Medical History:   Diagnosis Date     Chronic tonsillitis 2005    s/p tonsillectomy     Left forearm fracture 2009     Mononucleosis 10/2014       Past Surgical History:   Procedure Laterality Date     TONSILLECTOMY  2005       Family History   Problem Relation Age of Onset     Heart Disease Maternal Grandfather      Diabetes No family hx of      Lipids No family hx of        Social History     Tobacco Use     Smoking status: Never     Smokeless tobacco: Never   Substance Use Topics     Alcohol use: No     Alcohol/week: 0.0 standard drinks        Current Outpatient Medications   Medication     omeprazole (PRILOSEC) 20 MG DR capsule     PFIZER-BIONTECH COVID-19 VACC 30 MCG/0.3ML injection     SUMAtriptan (IMITREX) 25 MG tablet     tretinoin (RETIN-A) 0.025 % external cream     famotidine (PEPCID) 20 MG tablet     nortriptyline (PAMELOR) 25 MG capsule     ondansetron (ZOFRAN-ODT) 4 MG ODT tab     No current facility-administered medications for this visit.        Review of Systems   Constitutional, HEENT, cardiovascular,  pulmonary, GI, , musculoskeletal, neuro, skin, endocrine and psych systems are negative, except as otherwise noted.      Objective           Vitals:  No vitals were obtained today due to virtual visit.    Physical Exam   GENERAL: Healthy, alert and no distress  EYES: Eyes grossly normal to inspection.  No discharge or erythema, or obvious scleral/conjunctival abnormalities.  RESP: No audible wheeze, cough, or visible cyanosis.  No visible retractions or increased work of breathing.    SKIN: Visible skin clear. No significant rash, abnormal pigmentation or lesions.  NEURO: Cranial nerves grossly intact.  Mentation and speech appropriate for age.  PSYCH: Mentation appears normal, affect normal/bright, judgement and insight intact, normal speech and appearance well-groomed.      Video-Visit Details    Video Start Time: Start : 4.50 PM     Type of service:  Video Visit    Video End Time:Stop : 4.55 PM    Originating Location (pt. Location): Home        Distant Location (provider location):  On-site    Platform used for Video Visit: [employer]

## 2022-11-29 NOTE — PROGRESS NOTES
"  SUBJECTIVE:                                                    Zari Luke is a 17 year old female who presents to clinic today for the following health issues:      Acute Illness   Acute illness concerns: cough, sore throat   Onset: Monday     Fever: no    Chills/Sweats: YES    Headache (location?): YES    Sinus Pressure:YES    Conjunctivitis:  no    Ear Pain: no    Rhinorrhea: YES    Congestion: no     Sore Throat: YES     Cough: YES    Wheeze: no    Decreased Appetite: no    Nausea: YES    Vomiting: no    Diarrhea:  no    Dysuria/Freq.: no    Fatigue/Achiness: no    Sick/Strep Exposure: no     Therapies Tried and outcome: cough drops     Zari has been feeling ill for 4-5 days now.  She has headache, sore throat, runny nose, and cough. Doesn't think she's had fevers.  She does report some sinus pain, but that has been present for awhile preceding this acute illness.  Eating and drinking well. No one at school is sick.  She had mono in the past and this does not feel like that.           Reviewed and updated as needed this visit by clinical staff  Tobacco  Allergies  Meds         ROS:  Constitutional, HEENT, cardiovascular, pulmonary, gi and gu systems are negative, except as otherwise noted.    OBJECTIVE:                                                    BP 90/60 (BP Location: Left arm, Patient Position: Chair, Cuff Size: Adult Regular)  Pulse 89  Temp 97.3  F (36.3  C) (Tympanic)  Ht 5' 5.25\" (1.657 m)  Wt 115 lb (52.2 kg)  LMP 04/04/2017  SpO2 99%  BMI 18.99 kg/m2  Body mass index is 18.99 kg/(m^2).    Gen: tired appearing, pleasant adolescent, no distress  HEENT: PERRL, no conjunctival injection, + posterior pharynx erythema, MMM.  TM normal b/l.  + maxillary sinus tenderness.   Neck: supple, no LAD  Pulm: breathing comfortably, CTAB, no wheezes or rales  CV: RRR, normal S1 and S2, no murmurs  Ext: 2+ radial pulses        Diagnostic Test Results:  Strep screen - Negative     ASSESSMENT/PLAN:      " ----- Message from Mirian Arrieta MD sent at 11/29/2022 11:41 AM CST -----  Chest x-ray: no pneumonia.                                                     1. Viral pharyngitis  Viral URI.  Discussed supportive care.  I am not sure why she has sinus pressure chronically without associated congestion, but it would be reasonable to treat for sinusitis if her symptoms persist at 10 days.  Letter written for school.   - Strep, Rapid Screen  - Beta strep group A culture    F/U as needed for persistent or worsening symptoms.       Karen Carreon MD  [facility] [place]

## 2023-02-18 ENCOUNTER — NURSE TRIAGE (OUTPATIENT)
Dept: NURSING | Facility: CLINIC | Age: 23
End: 2023-02-18
Payer: COMMERCIAL

## 2023-02-18 ENCOUNTER — OFFICE VISIT (OUTPATIENT)
Dept: URGENT CARE | Facility: URGENT CARE | Age: 23
End: 2023-02-18
Payer: COMMERCIAL

## 2023-02-18 VITALS
SYSTOLIC BLOOD PRESSURE: 102 MMHG | WEIGHT: 129 LBS | TEMPERATURE: 98.3 F | OXYGEN SATURATION: 99 % | DIASTOLIC BLOOD PRESSURE: 78 MMHG | BODY MASS INDEX: 21.47 KG/M2 | HEART RATE: 69 BPM

## 2023-02-18 DIAGNOSIS — B96.89 BV (BACTERIAL VAGINOSIS): ICD-10-CM

## 2023-02-18 DIAGNOSIS — B37.31 CANDIDIASIS OF VAGINA: ICD-10-CM

## 2023-02-18 DIAGNOSIS — N76.0 BV (BACTERIAL VAGINOSIS): ICD-10-CM

## 2023-02-18 DIAGNOSIS — R10.84 ABDOMINAL PAIN, GENERALIZED: Primary | ICD-10-CM

## 2023-02-18 LAB
ALBUMIN UR-MCNC: NEGATIVE MG/DL
APPEARANCE UR: CLEAR
BACTERIA #/AREA URNS HPF: ABNORMAL /HPF
BILIRUB UR QL STRIP: NEGATIVE
CLUE CELLS: PRESENT
COLOR UR AUTO: YELLOW
GLUCOSE UR STRIP-MCNC: NEGATIVE MG/DL
HGB UR QL STRIP: NEGATIVE
KETONES UR STRIP-MCNC: NEGATIVE MG/DL
LEUKOCYTE ESTERASE UR QL STRIP: NEGATIVE
NITRATE UR QL: NEGATIVE
PH UR STRIP: 5.5 [PH] (ref 5–7)
RBC #/AREA URNS AUTO: ABNORMAL /HPF
SP GR UR STRIP: >=1.03 (ref 1–1.03)
SQUAMOUS #/AREA URNS AUTO: ABNORMAL /LPF
TRICHOMONAS, WET PREP: ABNORMAL
UROBILINOGEN UR STRIP-ACNC: 0.2 E.U./DL
WBC #/AREA URNS AUTO: ABNORMAL /HPF
WBC'S/HIGH POWER FIELD, WET PREP: ABNORMAL
YEAST, WET PREP: PRESENT

## 2023-02-18 PROCEDURE — 87210 SMEAR WET MOUNT SALINE/INK: CPT | Performed by: PHYSICIAN ASSISTANT

## 2023-02-18 PROCEDURE — 81001 URINALYSIS AUTO W/SCOPE: CPT | Performed by: PHYSICIAN ASSISTANT

## 2023-02-18 PROCEDURE — 99213 OFFICE O/P EST LOW 20 MIN: CPT | Performed by: PHYSICIAN ASSISTANT

## 2023-02-18 RX ORDER — FLUCONAZOLE 150 MG/1
150 TABLET ORAL ONCE
Qty: 1 TABLET | Refills: 0 | Status: SHIPPED | OUTPATIENT
Start: 2023-02-18 | End: 2023-02-18

## 2023-02-18 RX ORDER — METRONIDAZOLE 500 MG/1
500 TABLET ORAL 2 TIMES DAILY
Qty: 14 TABLET | Refills: 0 | Status: SHIPPED | OUTPATIENT
Start: 2023-02-18 | End: 2023-02-25

## 2023-02-18 ASSESSMENT — ENCOUNTER SYMPTOMS
ABDOMINAL PAIN: 1
DYSURIA: 0
SORE THROAT: 0
FEVER: 0

## 2023-02-18 NOTE — TELEPHONE ENCOUNTER
"\"For the past week I've been having lower abdominal pain.\" Amal denies feeling faint/ dizzy/ lightheaded, severe pain, vomiting blood/ black material, and blood/ black/ tarry material in stool. Patient denies being pregnant. Patient states for the past couple of days the pain has been constant, mild, and dull. Amal states she has also been vomiting almost every single day (1-2 times/ day) mostly clear liquid or whatever she ate. Patient has a history of migraines w/ vomiting, but has continued to vomit even with no headaches present. She also reports needing to use the restroom soon after drinking fluids and feels this is frequent. Otherwise, Amal is awake, alert, and responding appropriately.     Triage guidelines recommend to be seen within 4 hours. FNA RN reviewed care advice per protocol and advised to call back with any changes, worsening of symptoms, and questions or concerns. Amal verbalized understanding of and agreement with plan and had no further questions. Patient will plan to be seen in Urgent Care. RN advised to follow up with PCP as needed.     Reason for Disposition    [1] MILD-MODERATE pain AND [2] constant AND [3] present > 2 hours    Additional Information    Negative: Shock suspected (e.g., cold/pale/clammy skin, too weak to stand, low BP, rapid pulse)    Negative: Difficult to awaken or acting confused (e.g., disoriented, slurred speech)    Negative: Passed out (i.e., lost consciousness, collapsed and was not responding)    Negative: Sounds like a life-threatening emergency to the triager    Negative: [1] SEVERE pain (e.g., excruciating) AND [2] present > 1 hour    Negative: [1] SEVERE pain AND [2] age > 60 years    Negative: [1] Vomiting AND [2] contains red blood or black (\"coffee ground\") material  (Exception: few red streaks in vomit that only happened once)    Negative: Blood in bowel movements (Exception: blood on surface of BM with constipation)    Negative: Black or tarry bowel movements " (Exception: chronic-unchanged black-grey bowel movements AND is taking iron pills or Pepto-bismol)    Negative: Patient sounds very sick or weak to the triager    Protocols used: ABDOMINAL PAIN - FEMALE-A-    Marta Gallardo RN-BSN  [facility]

## 2023-03-02 ENCOUNTER — OFFICE VISIT (OUTPATIENT)
Dept: FAMILY MEDICINE | Facility: CLINIC | Age: 23
End: 2023-03-02
Payer: COMMERCIAL

## 2023-03-02 DIAGNOSIS — L70.0 ACNE VULGARIS: Primary | ICD-10-CM

## 2023-03-02 PROCEDURE — 99203 OFFICE O/P NEW LOW 30 MIN: CPT | Performed by: PHYSICIAN ASSISTANT

## 2023-03-02 RX ORDER — SPIRONOLACTONE 50 MG/1
50 TABLET, FILM COATED ORAL DAILY
Qty: 90 TABLET | Refills: 1 | Status: SHIPPED | OUTPATIENT
Start: 2023-03-02 | End: 2024-01-18

## 2023-03-02 NOTE — PROGRESS NOTES
[facility] Dermatology Note  Encounter Date: Mar 2, 2023  Office Visit      Dermatology Problem List:  1. Acne, hormonal component - PIH  -Current tx: spironolactone 50mg every day, trifarotene 0.005% cream nightly  -Previous tx: tretinoin (too drying), OCPs (too many adverse effects)  ____________________________________________    Assessment & Plan:  # Acne with PIH, hormonal component  -Side effects of spironolactone discussed including postural hypotension, increased frequency of urination, breast tenderness, menstrual spotting, cardiac arrythmias and the need for contraception due to fetal feminization. Discussed with patient that medication will need to be stopped if pregnancy is desired.  - Start Spironolactone 50mg daily   - start trifarotene topically every day on clean skin - edu on potential for dry skin  -continue daily sunscreen, at least SPF 30    Procedures Performed:   none    Follow-up: 3 month(s) in-person, or earlier for new or changing lesions    Staff:     All risks, benefits and alternatives were discussed with patient.  Patient is in agreement and understands the assessment and plan.  All questions were answered.    Sharon Shabazz PA-C, MPAS  [facility] [place]: Phone: [phone], Fax: [phone]  [facility]: Phone: [phone],  Fax: [phone]  ____________________________________________    CC: Acne    HPI:  Ms. Zari Luke is a 22 year old female who presents today as a new patient for acne. Was last seen by Dr. Salazar 5/18/20 for an itchy rash. Hx eczema as a child. Notes acne worse with menstrual cycle. Has used tretinoin in the past, but was too drying. Previously on OCPs and had too many psych side effects so not interested.     Patient is otherwise feeling well, without additional concerns.    Labs:  none    Physical Exam:  Vitals: LMP 01/28/2023 (Approximate)   SKIN:  Acne exam, which includes the face, neck, upper central chest, and upper central back was performed.   - few comedones on the forehead, brown macule son the cheeks    - No other lesions of concern on areas examined.     Medications:  Current Outpatient Medications   Medication     famotidine (PEPCID) 20 MG tablet     nortriptyline (PAMELOR) 25 MG capsule     omeprazole (PRILOSEC) 20 MG DR capsule     ondansetron (ZOFRAN-ODT) 4 MG ODT tab     PFIZERBig Stage COVID-19 VACC 30 MCG/0.3ML injection     SUMAtriptan (IMITREX) 25 MG tablet     tretinoin (RETIN-A) 0.025 % external cream     No current facility-administered medications for this visit.      Past Medical/Surgical History:   Patient Active Problem List   Diagnosis     Episodic tension-type headache, not intractable     Fatigue, unspecified type     Menorrhagia with regular cycle     Neck pain     Daily headache     Cervical pain     Tension headache     Contraceptive management     Nonintractable episodic headache, unspecified headache type     Thyroid nodule     GERD (gastroesophageal reflux disease)     Past Medical History:   Diagnosis Date     Chronic tonsillitis 2005    s/p tonsillectomy     Left forearm fracture 2009     Mononucleosis 10/2014

## 2023-03-02 NOTE — LETTER
3/2/2023         RE: Zari Luke  [address]  [place] [address]        Dear Colleague,    Thank you for referring your patient, Zari Luke, to the [facility] [place]. Please see a copy of my visit note below.    [facility] Dermatology Note  Encounter Date: Mar 2, 2023  Office Visit      Dermatology Problem List:  1. Acne, hormonal component - PIH  -Current tx: spironolactone 50mg every day, trifarotene 0.005% cream nightly  -Previous tx: tretinoin (too drying), OCPs (too many adverse effects)  ____________________________________________    Assessment & Plan:  # Acne with PIH, hormonal component  -Side effects of spironolactone discussed including postural hypotension, increased frequency of urination, breast tenderness, menstrual spotting, cardiac arrythmias and the need for contraception due to fetal feminization. Discussed with patient that medication will need to be stopped if pregnancy is desired.  - Start Spironolactone 50mg daily   - start trifarotene topically every day on clean skin - edu on potential for dry skin  -continue daily sunscreen, at least SPF 30    Procedures Performed:   none    Follow-up: 3 month(s) in-person, or earlier for new or changing lesions    Staff:     All risks, benefits and alternatives were discussed with patient.  Patient is in agreement and understands the assessment and plan.  All questions were answered.    Sharon Shabazz PA-C, MPAS  [facility] [place]: Phone: [phone], Fax: [phone]  [facility]: Phone: [phone],  Fax: [phone]  ____________________________________________    CC: Acne    HPI:  Ms. Zari Luke is a 22 year old female who presents today as a new patient for acne. Was last seen by Dr. Salazar 5/18/20 for an itchy rash. Hx eczema as a child. Notes acne worse with menstrual cycle. Has used tretinoin  in the past, but was too drying. Previously on OCPs and had too many psych side effects so not interested.     Patient is otherwise feeling well, without additional concerns.    Labs:  none    Physical Exam:  Vitals: LMP 01/28/2023 (Approximate)   SKIN: Acne exam, which includes the face, neck, upper central chest, and upper central back was performed.   - few comedones on the forehead, brown macule son the cheeks    - No other lesions of concern on areas examined.     Medications:  Current Outpatient Medications   Medication     famotidine (PEPCID) 20 MG tablet     nortriptyline (PAMELOR) 25 MG capsule     omeprazole (PRILOSEC) 20 MG DR capsule     ondansetron (ZOFRAN-ODT) 4 MG ODT tab     PFIZER-BIONTECH COVID-19 VACC 30 MCG/0.3ML injection     SUMAtriptan (IMITREX) 25 MG tablet     tretinoin (RETIN-A) 0.025 % external cream     No current facility-administered medications for this visit.      Past Medical/Surgical History:   Patient Active Problem List   Diagnosis     Episodic tension-type headache, not intractable     Fatigue, unspecified type     Menorrhagia with regular cycle     Neck pain     Daily headache     Cervical pain     Tension headache     Contraceptive management     Nonintractable episodic headache, unspecified headache type     Thyroid nodule     GERD (gastroesophageal reflux disease)     Past Medical History:   Diagnosis Date     Chronic tonsillitis 2005    s/p tonsillectomy     Left forearm fracture 2009     Mononucleosis 10/2014                          Again, thank you for allowing me to participate in the care of your patient.        Sincerely,        Sharon Shabazz PA-C

## 2023-03-06 ENCOUNTER — TELEPHONE (OUTPATIENT)
Dept: FAMILY MEDICINE | Facility: CLINIC | Age: 23
End: 2023-03-06
Payer: COMMERCIAL

## 2023-03-06 NOTE — TELEPHONE ENCOUNTER
Prior Authorization Retail Medication Request    Medication/Dose: Trifarotene (aklief) 0.005% cream  ICD code (if different than what is on RX):    Previously Tried and Failed:    Rationale:      Insurance Name:    Insurance ID:        Pharmacy Information (if different than what is on RX)  Name:  [facility]  Phone:  [phone]    Keira BUTCHER RN  [facility] [place]  [phone]

## 2023-04-16 ENCOUNTER — HEALTH MAINTENANCE LETTER (OUTPATIENT)
Age: 23
End: 2023-04-16

## 2023-05-06 ENCOUNTER — ANCILLARY PROCEDURE (OUTPATIENT)
Dept: GENERAL RADIOLOGY | Facility: CLINIC | Age: 23
End: 2023-05-06
Attending: PREVENTIVE MEDICINE
Payer: COMMERCIAL

## 2023-05-06 ENCOUNTER — OFFICE VISIT (OUTPATIENT)
Dept: URGENT CARE | Facility: URGENT CARE | Age: 23
End: 2023-05-06
Payer: COMMERCIAL

## 2023-05-06 VITALS
TEMPERATURE: 98.3 F | WEIGHT: 126 LBS | OXYGEN SATURATION: 100 % | HEART RATE: 66 BPM | BODY MASS INDEX: 20.97 KG/M2 | DIASTOLIC BLOOD PRESSURE: 71 MMHG | SYSTOLIC BLOOD PRESSURE: 99 MMHG

## 2023-05-06 DIAGNOSIS — N94.9 VAGINAL SYMPTOM: Primary | ICD-10-CM

## 2023-05-06 DIAGNOSIS — N94.9 VAGINAL SYMPTOM: ICD-10-CM

## 2023-05-06 DIAGNOSIS — K59.00 CONSTIPATION, UNSPECIFIED CONSTIPATION TYPE: ICD-10-CM

## 2023-05-06 LAB
ALBUMIN UR-MCNC: NEGATIVE MG/DL
APPEARANCE UR: CLEAR
BILIRUB UR QL STRIP: NEGATIVE
CLUE CELLS: ABNORMAL
COLOR UR AUTO: YELLOW
GLUCOSE UR STRIP-MCNC: NEGATIVE MG/DL
HCG UR QL: NEGATIVE
HGB UR QL STRIP: NEGATIVE
KETONES UR STRIP-MCNC: NEGATIVE MG/DL
LEUKOCYTE ESTERASE UR QL STRIP: NEGATIVE
NITRATE UR QL: NEGATIVE
PH UR STRIP: 6 [PH] (ref 5–7)
SP GR UR STRIP: >=1.03 (ref 1–1.03)
TRICHOMONAS, WET PREP: ABNORMAL
UROBILINOGEN UR STRIP-ACNC: 0.2 E.U./DL
WBC'S/HIGH POWER FIELD, WET PREP: ABNORMAL
YEAST, WET PREP: ABNORMAL

## 2023-05-06 PROCEDURE — 81003 URINALYSIS AUTO W/O SCOPE: CPT

## 2023-05-06 PROCEDURE — 87210 SMEAR WET MOUNT SALINE/INK: CPT

## 2023-05-06 PROCEDURE — 99215 OFFICE O/P EST HI 40 MIN: CPT

## 2023-05-06 PROCEDURE — 81025 URINE PREGNANCY TEST: CPT

## 2023-05-06 PROCEDURE — 74019 RADEX ABDOMEN 2 VIEWS: CPT | Mod: TC | Performed by: RADIOLOGY

## 2023-05-06 RX ORDER — POLYETHYLENE GLYCOL 3350 17 G/17G
17 POWDER, FOR SOLUTION ORAL 2 TIMES DAILY
Qty: 510 G | Refills: 0 | Status: SHIPPED | OUTPATIENT
Start: 2023-05-06 | End: 2024-01-18

## 2023-05-06 NOTE — PROGRESS NOTES
Assessment & Plan     (K59.00) Constipation, unspecified constipation type  Xray suggests constipation  UA, U hcg, wet prep negative  Advised blood work - declined by patient  Miralax 17 g two times per day, fruits and vegetable, push fluids  Follow up in 1-2 weeks for a recheck or sooner as needed       42 minutes spent by me on the date of the encounter doing chart review, review of outside records, review of test results, interpretation of tests, patient visit and documentation         No follow-ups on file.    Kan Tony MD  [facility]    Subjective     Zari Luke is a 23 year old year old female who presents to clinic today for the following health issues:    Patient presents with:  Abdominal Pain: Lower back pain, vaginal sx urine freq- seen 2/18 for similar sx     This is a 22 yo female who presents with low abdominal pain and low back pain for 2 days.  No fever or chills.  Slight urinary frequency.  No abdominal pain, n/v/d/blood in stool or urine. Also low back pain.  Had similar symptoms in 2/2023 and it was BV.  No hx of std.  Also has been having a harder to time having regular bowel movements recently.  Stools are harder as well.    Patient Active Problem List   Diagnosis     Episodic tension-type headache, not intractable     Fatigue, unspecified type     Menorrhagia with regular cycle     Neck pain     Daily headache     Cervical pain     Tension headache     Contraceptive management     Nonintractable episodic headache, unspecified headache type     Thyroid nodule     GERD (gastroesophageal reflux disease)       Current Outpatient Medications   Medication     polyethylene glycol (MIRALAX) 17 GM/Dose powder     Trifarotene 0.005 % CREA     famotidine (PEPCID) 20 MG tablet     nortriptyline (PAMELOR) 25 MG capsule     omeprazole (PRILOSEC) 20 MG DR capsule     ondansetron (ZOFRAN-ODT) 4 MG ODT tab     PFIZER-Tribold COVID-19 VACC 30 MCG/0.3ML injection      spironolactone (ALDACTONE) 50 MG tablet     SUMAtriptan (IMITREX) 25 MG tablet     tretinoin (RETIN-A) 0.025 % external cream     No current facility-administered medications for this visit.       Past Medical History:   Diagnosis Date     Chronic tonsillitis 2005    s/p tonsillectomy     Left forearm fracture 2009     Mononucleosis 10/2014       Social History   reports that she has never smoked. She has never used smokeless tobacco. She reports that she does not drink alcohol and does not use drugs.    Family History   Problem Relation Age of Onset     Heart Disease Maternal Grandfather      Diabetes No family hx of      Lipids No family hx of        Review of Systems  Constitutional, HEENT, cardiovascular, pulmonary, GI, , musculoskeletal, neuro, skin, endocrine and psych systems are negative, except as otherwise noted.      Objective    BP 99/71   Pulse 66   Temp 98.3  F (36.8  C) (Tympanic)   Wt 57.2 kg (126 lb)   LMP 04/27/2023 (Approximate)   SpO2 100%   BMI 20.97 kg/m    Physical Exam   GENERAL: healthy, alert and no distress  EYES: Eyes grossly normal to inspection, PERRL and conjunctivae and sclerae normal  HENT: ear canals and TM's normal, nose and mouth without ulcers or lesions  NECK: no adenopathy, no asymmetry, masses, or scars and thyroid normal to palpation  RESP: lungs clear to auscultation - no rales, rhonchi or wheezes  CV: regular rate and rhythm, normal S1 S2, no S3 or S4, no murmur, click or rub, no peripheral edema and peripheral pulses strong  ABDOMEN: soft, ttp blq, no guarding, no rebound, no ttp mcburneys pt, neg murphys, neg carnetts, no hepatosplenomegaly, no masses and bowel sounds normal  MS: no gross musculoskeletal defects noted, no edema  SKIN: no suspicious lesions or rashes  NEURO: Normal strength and tone, mentation intact and speech normal  PSYCH: mentation appears normal, affect normal/bright    Results for orders placed or performed in visit on 05/06/23   XR Abdomen 2  Views     Status: None    Narrative    EXAM: XR ABDOMEN 2 VIEWS  LOCATION: [facility]  DATE/TIME: 5/6/2023 10:02 AM CDT    INDICATION: lower abdominal pain  COMPARISON: None.      Impression    IMPRESSION: Lung bases are clear. No evidence of free air on the upright view. No abnormally dilated loops of bowel. Mild to moderate stool burden. No suspicious calcifications.   Results for orders placed or performed in visit on 05/06/23   UA Macroscopic with reflex to Microscopic and Culture     Status: Normal    Specimen: Urine, Clean Catch   Result Value Ref Range    Color Urine Yellow Colorless, Straw, Light Yellow, Yellow    Appearance Urine Clear Clear    Glucose Urine Negative Negative mg/dL    Bilirubin Urine Negative Negative    Ketones Urine Negative Negative mg/dL    Specific Gravity Urine >=1.030 1.003 - 1.035    Blood Urine Negative Negative    pH Urine 6.0 5.0 - 7.0    Protein Albumin Urine Negative Negative mg/dL    Urobilinogen Urine 0.2 0.2, 1.0 E.U./dL    Nitrite Urine Negative Negative    Leukocyte Esterase Urine Negative Negative    Narrative    Microscopic not indicated   HCG qualitative urine     Status: Normal   Result Value Ref Range    hCG Urine Qualitative Negative Negative   Wet prep - Clinic Collect     Status: Abnormal    Specimen: Vagina; Swab   Result Value Ref Range    Trichomonas Absent Absent    Yeast Absent Absent    Clue Cells Absent Absent    WBCs/high power field 1+ (A) None

## 2023-05-06 NOTE — PATIENT INSTRUCTIONS
Push fluids, miralax 17 grams two times per day, increase fruits and vegetables.      Follow up with pcp in 1-2 weeks for recheck.

## 2024-01-18 ENCOUNTER — OFFICE VISIT (OUTPATIENT)
Dept: FAMILY MEDICINE | Facility: CLINIC | Age: 24
End: 2024-01-18
Payer: COMMERCIAL

## 2024-01-18 VITALS
DIASTOLIC BLOOD PRESSURE: 70 MMHG | RESPIRATION RATE: 15 BRPM | WEIGHT: 128.6 LBS | HEIGHT: 66 IN | BODY MASS INDEX: 20.67 KG/M2 | HEART RATE: 88 BPM | OXYGEN SATURATION: 100 % | SYSTOLIC BLOOD PRESSURE: 106 MMHG | TEMPERATURE: 97.9 F

## 2024-01-18 DIAGNOSIS — N72 CERVICITIS: ICD-10-CM

## 2024-01-18 DIAGNOSIS — Z00.00 ROUTINE GENERAL MEDICAL EXAMINATION AT A HEALTH CARE FACILITY: Primary | ICD-10-CM

## 2024-01-18 DIAGNOSIS — G43.009 MIGRAINE WITHOUT AURA AND WITHOUT STATUS MIGRAINOSUS, NOT INTRACTABLE: ICD-10-CM

## 2024-01-18 DIAGNOSIS — Z13.220 ENCOUNTER FOR SCREENING FOR LIPID DISORDER: ICD-10-CM

## 2024-01-18 DIAGNOSIS — F41.8 DEPRESSION WITH ANXIETY: ICD-10-CM

## 2024-01-18 DIAGNOSIS — Z11.4 SCREENING FOR HIV (HUMAN IMMUNODEFICIENCY VIRUS): ICD-10-CM

## 2024-01-18 DIAGNOSIS — Z11.59 NEED FOR HEPATITIS C SCREENING TEST: ICD-10-CM

## 2024-01-18 DIAGNOSIS — K21.9 GASTROESOPHAGEAL REFLUX DISEASE, UNSPECIFIED WHETHER ESOPHAGITIS PRESENT: ICD-10-CM

## 2024-01-18 PROCEDURE — 99395 PREV VISIT EST AGE 18-39: CPT | Performed by: INTERNAL MEDICINE

## 2024-01-18 PROCEDURE — 99214 OFFICE O/P EST MOD 30 MIN: CPT | Mod: 25 | Performed by: INTERNAL MEDICINE

## 2024-01-18 ASSESSMENT — ENCOUNTER SYMPTOMS: ABDOMINAL PAIN: 1

## 2024-01-18 ASSESSMENT — PAIN SCALES - GENERAL: PAINLEVEL: MODERATE PAIN (4)

## 2024-01-18 NOTE — PATIENT INSTRUCTIONS
As discussed , please do fasting labs placed .     Will consider checking your Herpes on vagina as requested on your upcoming PAP only visit as you ae menstruating today.     Recommend to start on an antianxiety medication , and follow up with your Psychotherapist. But since you declined the medication today , will await for you to inform us whenever you decide on it.     Safety plan reviewed. To the Emergency Department as needed or call after hours crisis line at [phone] or [phone]. [place] Crisis Text Line: Text [place] to 629693  or  Suicide LifeLine Chat: suicideSecucloud.org/chat/  Follow up with primary care provider as planned or for acute medical concerns.        Crisis Resources:    National Suicide Prevention Lifeline: 168.593.1825 (TTY: 540.908.4029). Call anytime for help.  (www.suicidepreventionlifeline.org)  National [place] on Mental Illness (www.[place].org): 961.598.1933 or 375-624-6393.   Mental Health Association (www.mentalhealth.org): 262.877.8611 or 270-053-3892.  [place] Crisis Text Line: Text [place] to 712465  Suicide LifeLine Chat: MDSave.org/chat      =======================================  Preventive Health Recommendations  Female Ages 21 to 25     Yearly exam:   See your health care provider every year in order to  Review health changes.   Discuss preventive care.    Review your medicines if your doctor has prescribed any.    You should be tested each year for STDs (sexually transmitted diseases).     Talk to your provider about how often you should have cholesterol testing.    Get a Pap test every three years. If you have an abnormal result, your doctor may have you test more often.    If you are at risk for diabetes, you should have a diabetes test (fasting glucose).     Shots:   Get a flu shot each year.   Get a tetanus shot every 10 years.   Consider getting the shot (vaccine) that prevents cervical cancer (Gardasil).    Nutrition:   Eat at least 5  servings of fruits and vegetables each day.  Eat whole-grain bread, whole-wheat pasta and brown rice instead of white grains and rice.  Get adequate Calcium and Vitamin D.     Lifestyle  Exercise at least 150 minutes a week each week (30 minutes a day, 5 days a week). This will help you control your weight and prevent disease.  Limit alcohol to one drink per day.  No smoking.   Wear sunscreen to prevent skin cancer.  See your dentist every six months for an exam and cleaning.

## 2024-01-18 NOTE — PROGRESS NOTES
Preventive Care Visit  [facility] [place] [facility] Zavaleta MD, Internal Medicine  Jan 18, 2024       SUBJECTIVE:   Zari is a 23 year old, presenting for the following:  Physical        1/18/2024     8:54 AM   Additional Questions   Roomed by Kiko     Healthy Habits:     Getting at least 3 servings of Calcium per day:  Yes    Bi-annual eye exam:  Yes    Dental care twice a year:  NO    Sleep apnea or symptoms of sleep apnea:  None    Diet:  Regular (no restrictions)    Frequency of exercise:  None    Taking medications regularly:  Yes    Medication side effects:  Not applicable    Additional concerns today:  No      Today's PHQ-2 Score:       1/18/2024     8:49 AM   PHQ-2 ( 1999 [employer])   Q1: Little interest or pleasure in doing things 0   Q2: Feeling down, depressed or hopeless 0   PHQ-2 Score 0   Q1: Little interest or pleasure in doing things Not at all   Q2: Feeling down, depressed or hopeless Not at all   PHQ-2 Score 0     Have you ever done Advance Care Planning? (For example, a Health Directive, POLST, or a discussion with a medical provider or your loved ones about your wishes): N/A    Social History     Tobacco Use    Smoking status: Never    Smokeless tobacco: Never   Substance Use Topics    Alcohol use: No     Alcohol/week: 0.0 standard drinks of alcohol             1/18/2024     8:57 AM   Alcohol Use   Prescreen: >3 drinks/day or >7 drinks/week? No          No data to display              Reviewed orders with patient.  Reviewed health maintenance and updated orders accordingly - Yes  Lab work is in process  Labs reviewed in EPIC    Breast Cancer Screening:        History of abnormal Pap smear: NO - age 21-29 PAP every 3 years recommended     Reviewed and updated as needed this visit by clinical staff   Tobacco  Allergies  Meds              Reviewed and updated as needed this visit by Provider                  Past Medical History:   Diagnosis Date    Chronic tonsillitis 2005     "s/p tonsillectomy    Left forearm fracture 2009    Mononucleosis 10/2014      Past Surgical History:   Procedure Laterality Date    TONSILLECTOMY  2005     OB History   No obstetric history on file.       OBJECTIVE:   /70   Pulse 88   Temp 97.9  F (36.6  C) (Temporal)   Resp 15   Ht 1.671 m (5' 5.8\")   Wt 58.3 kg (128 lb 9.6 oz)   LMP 01/17/2024 (Exact Date)   SpO2 100%   BMI 20.88 kg/m     Estimated body mass index is 20.88 kg/m  as calculated from the following:    Height as of this encounter: 1.671 m (5' 5.8\").    Weight as of this encounter: 58.3 kg (128 lb 9.6 oz).  Review of Systems   Gastrointestinal:  Positive for abdominal pain.   Genitourinary:  Positive for pelvic pain.       Review of Systems  Constitutional, HEENT, cardiovascular, pulmonary, GI, , musculoskeletal, neuro, skin, endocrine and psych systems are negative, except as otherwise noted.  Physical Exam  GENERAL: alert and no distress  EYES: Eyes grossly normal to inspection, PERRL and conjunctivae and sclerae normal  HENT: ear canals and TM's normal, nose and mouth without ulcers or lesions  NECK: no adenopathy, no asymmetry, masses, or scars  RESP: lungs clear to auscultation - no rales, rhonchi or wheezes  CV: regular rate and rhythm, normal S1 S2, no S3 or S4, no murmur, click or rub, no peripheral edema  ABDOMEN: soft, nontender, no hepatosplenomegaly, no masses and bowel sounds normal  MS: no gross musculoskeletal defects noted, no edema  SKIN: no suspicious lesions or rashes  NEURO: Normal strength and tone, mentation intact and speech normal  PSYCH: mentation appears normal, affect normal/bright  LYMPH: no cervical, supraclavicular, axillary, or inguinal adenopathy    Diagnostic Test Results:  Labs reviewed in Epic    ASSESSMENT/PLAN:       Assessment and Plan  1. Routine general medical examination at a health care facility  Last seen pt for virtual visits in 10/2022.  Patient is tearful most of the appointment as she " recollects her previous trauma at the age of 19 years which she does not want me to document in this encounter.  Would like to do a vaginal exam but currently menstruating and will come back for a Pap only visit as she is concerned on this infection which we will check had a Pap only visit.  -I will plan to get the baseline check on this physical will do as requested.  - HIV Antigen Antibody Combo; Future  - Hepatitis C Screen Reflex to HCV RNA Quant and Genotype; Future  - REVIEW OF HEALTH MAINTENANCE PROTOCOL ORDERS  - Comprehensive metabolic panel (BMP + Alb, Alk Phos, ALT, AST, Total. Bili, TP); Future  - CBC with platelets; Future  - Lipid panel reflex to direct LDL Fasting; Future    2. Depression with anxiety  Ongoing problem, intermittent flareups.  Offered her medication which she declined.  Continue current psychotherapy counseling for improvement.  Safety plan discussed on the after visit summary.  - TSH with free T4 reflex; Future    3. Cervicitis  New diagnosis added to patient problem list, Recent ER visit in October 2023 for elevated disease of cervix and pelvic and perineal pain at that time.  I do not see anybody in regards to the ER physician and patient's chart.  She does have past history of vaginal candidiasis wet prep, will need pelvic exam and recheck.  Versus transvaginal ultrasound.  Will await for patient to come back for the Pap with negative      4. Gastroesophageal reflux disease, unspecified whether esophagitis present  Chronic well-controlled, continue current diet and lifestyle modifications.  - Comprehensive metabolic panel (BMP + Alb, Alk Phos, ALT, AST, Total. Bili, TP); Future  - CBC with platelets; Future    5. Screening for HIV (human immunodeficiency virus)  - HIV Antigen Antibody Combo; Future    6. Need for hepatitis C screening test  - Hepatitis C Screen Reflex to HCV RNA Quant and Genotype; Future    7. Migraine without aura and without status migrainosus, not  intractable  Chronic stable condition, continue current over-the-counter ibuprofen as needed for symptoms.    8. Encounter for screening for lipid disorder  - Lipid panel reflex to direct LDL Fasting; Future         Please note that this note consists of symbols derived from keyboarding, dictation and/or voice recognition software. As a result, there may be errors in the script that have gone undetected. Please consider this when interpreting information found in this chart.    Patient Instructions   As discussed , please do fasting labs placed .     Will consider checking your Herpes on vagina as requested on your upcoming PAP only visit as you ae menstruating today.     Recommend to start on an antianxiety medication , and follow up with your Psychotherapist. But since you declined the medication today , will await for you to inform us whenever you decide on it.     Safety plan reviewed. To the Emergency Department as needed or call after hours crisis line at [phone] or [phone]. [place] Crisis Text Line: Text [place] to 646910  or  Suicide LifeLine Chat: suicideePrimeCare.org/chat/  Follow up with primary care provider as planned or for acute medical concerns.        Crisis Resources:    National Suicide Prevention Lifeline: 823.421.9248 (TTY: 737.752.6268). Call anytime for help.  (www.suicidepreventionlifeline.org)  National [place] on Mental Illness (www.blayne.org): 808.834.4753 or 130-193-5074.   Mental Health Association (www.mentalhealth.org): 498.267.7131 or 420-849-1752.  [place] Crisis Text Line: Text [place] to 582908  Suicide LifeLine Chat: suicideePrimeCare.org/chat      =======================================  Preventive Health Recommendations  Female Ages 21 to 25     Yearly exam:   See your health care provider every year in order to  Review health changes.   Discuss preventive care.    Review your medicines if your doctor has prescribed any.    You should be tested each year for  STDs (sexually transmitted diseases).     Talk to your provider about how often you should have cholesterol testing.    Get a Pap test every three years. If you have an abnormal result, your doctor may have you test more often.    If you are at risk for diabetes, you should have a diabetes test (fasting glucose).     Shots:   Get a flu shot each year.   Get a tetanus shot every 10 years.   Consider getting the shot (vaccine) that prevents cervical cancer (Gardasil).    Nutrition:   Eat at least 5 servings of fruits and vegetables each day.  Eat whole-grain bread, whole-wheat pasta and brown rice instead of white grains and rice.  Get adequate Calcium and Vitamin D.     Lifestyle  Exercise at least 150 minutes a week each week (30 minutes a day, 5 days a week). This will help you control your weight and prevent disease.  Limit alcohol to one drink per day.  No smoking.   Wear sunscreen to prevent skin cancer.  See your dentist every six months for an exam and cleaning.  Return in about 4 weeks (around 2/15/2024), or if symptoms worsen or fail to improve, for If symptoms persist, Follow up of last visit PAP only visit .    Zarina Zavaleta MD  [facility]          Patient has been advised of split billing requirements and indicates understanding: Yes      Counseling  Reviewed preventive health counseling, as reflected in patient instructions  Special attention given to:        Regular exercise       Healthy diet/nutrition       Vision screening       Hearing screening       Immunizations  Declined: Covid-19, Influenza, and TDAP due to Concerns about side effects/safety             Alcohol Use       Contraception       Safe sex practices/STD prevention       Consider Hep C screening for all patients one time for ages 18-79 years       HIV screeninx in teen years, 1x in adult years, and at intervals if high risk        She reports that she has never smoked. She has never used smokeless  tobacco.          Signed Electronically by: Zarina Zavaleta MD

## 2024-06-25 ENCOUNTER — VIRTUAL VISIT (OUTPATIENT)
Dept: FAMILY MEDICINE | Facility: CLINIC | Age: 24
End: 2024-06-25
Payer: COMMERCIAL

## 2024-06-25 DIAGNOSIS — F41.1 GAD (GENERALIZED ANXIETY DISORDER): Primary | ICD-10-CM

## 2024-06-25 PROBLEM — F41.8 DEPRESSION WITH ANXIETY: Status: RESOLVED | Noted: 2024-01-18 | Resolved: 2024-06-25

## 2024-06-25 PROCEDURE — 99214 OFFICE O/P EST MOD 30 MIN: CPT | Mod: 95 | Performed by: FAMILY MEDICINE

## 2024-06-25 RX ORDER — ALPRAZOLAM 0.25 MG
0.25 TABLET ORAL DAILY PRN
Qty: 10 TABLET | Refills: 0 | Status: SHIPPED | OUTPATIENT
Start: 2024-06-25 | End: 2024-09-09

## 2024-06-25 RX ORDER — SERTRALINE HYDROCHLORIDE 25 MG/1
25 TABLET, FILM COATED ORAL DAILY
Qty: 30 TABLET | Refills: 1 | Status: SHIPPED | OUTPATIENT
Start: 2024-06-25 | End: 2024-08-14

## 2024-06-25 ASSESSMENT — ANXIETY QUESTIONNAIRES
6. BECOMING EASILY ANNOYED OR IRRITABLE: MORE THAN HALF THE DAYS
5. BEING SO RESTLESS THAT IT IS HARD TO SIT STILL: NOT AT ALL
2. NOT BEING ABLE TO STOP OR CONTROL WORRYING: NEARLY EVERY DAY
8. IF YOU CHECKED OFF ANY PROBLEMS, HOW DIFFICULT HAVE THESE MADE IT FOR YOU TO DO YOUR WORK, TAKE CARE OF THINGS AT HOME, OR GET ALONG WITH OTHER PEOPLE?: SOMEWHAT DIFFICULT
1. FEELING NERVOUS, ANXIOUS, OR ON EDGE: NEARLY EVERY DAY
IF YOU CHECKED OFF ANY PROBLEMS ON THIS QUESTIONNAIRE, HOW DIFFICULT HAVE THESE PROBLEMS MADE IT FOR YOU TO DO YOUR WORK, TAKE CARE OF THINGS AT HOME, OR GET ALONG WITH OTHER PEOPLE: SOMEWHAT DIFFICULT
3. WORRYING TOO MUCH ABOUT DIFFERENT THINGS: NEARLY EVERY DAY
7. FEELING AFRAID AS IF SOMETHING AWFUL MIGHT HAPPEN: SEVERAL DAYS
4. TROUBLE RELAXING: MORE THAN HALF THE DAYS
GAD7 TOTAL SCORE: 14
GAD7 TOTAL SCORE: 14
7. FEELING AFRAID AS IF SOMETHING AWFUL MIGHT HAPPEN: SEVERAL DAYS

## 2024-06-25 ASSESSMENT — ENCOUNTER SYMPTOMS: NERVOUS/ANXIOUS: 1

## 2024-06-25 NOTE — PROGRESS NOTES
Zari is a 24 year old who is being evaluated via a billable video visit.    How would you like to obtain your AVS? MyChart  If the video visit is dropped, the invitation should be resent by: Text to cell phone: [phone]  Will anyone else be joining your video visit? No      Assessment & Plan     (F41.8) Depression with anxiety  (primary encounter diagnosis)  Comment:   Plan: sertraline (ZOLOFT) 25 MG tablet, ALPRAZolam         (XANAX) 0.25 MG tablet            Discussed cares, talked about signs and symptoms of anxiety/ depression and treatment options.       Willing to try low dose zoloft. Also gave few xanax to take as needed       Pros/ cons of med's discussed .        encouraged to continue to  see  to help.         spent sometimes counseling patient.       Follow up in 3-4 weeks,  sooner if problem.     Script  sent.Cares and  treatment discussed.    Patient expressed understanding and agreement with treatment plan. All patient's questions were answered, will let me know if has more later.  Medications: Rx's: Reviewed the potential side effects/complications of medications prescribed.     Regular exercise  See Patient Instructions    Subjective   Zari is a 24 year old, presenting for the following health issues:  Anxiety        6/25/2024     3:49 PM   Additional Questions   Roomed by Myrtle         Anxiety    History of Present Illness       Mental Health Follow-up:  Patient presents to follow-up on Anxiety.    Patient's anxiety since last visit has been:  Good  The patient is not having other symptoms associated with anxiety.  Any significant life events: No  Patient is feeling anxious or having panic attacks.  Patient has no concerns about alcohol or drug use.    She eats 2-3 servings of fruits and vegetables daily.She consumes 1 sweetened beverage(s) daily.She exercises with enough effort to increase her heart rate 30 to 60 minutes per day.  She exercises with enough effort to increase her heart  "rate 3 or less days per week.   She is taking medications regularly.       Anxiety   How are you doing with your anxiety since your last visit? Same ,  has seen therapist for long time and lately continue to have some  anxiety sx ,so  she advised her to try medication.  she has never taken any medication for this and she thinks she has struggled through anxiety all her life and wants to try something to help   . She thinks social interaction may trigger some anxiety   sometimes other stressful situations  can creat more anxiety. Sometimes she thinks it can be overwhelming.no bad panic attacks othewise   and \" she feels like  it freezes her \"    Mom also has probably some issue with anxiety and not jodie if she is taking anything to help   Are you having other symptoms that might be associated with anxiety? Yes, See phq-9 and dwight 7  Have you had a significant life event? No   Are you feeling depressed? No  Do you have any concerns with your use of alcohol or other drugs? No    Social History     Tobacco Use    Smoking status: Never    Smokeless tobacco: Never   Substance Use Topics    Alcohol use: No     Alcohol/week: 0.0 standard drinks of alcohol    Drug use: No         6/25/2024     3:49 PM   DWIGHT-7 SCORE   Total Score 14 (moderate anxiety)   Total Score 14          No data to display                   No data to display                  6/25/2024     3:49 PM   DWIGHT-7    1. Feeling nervous, anxious, or on edge 3   2. Not being able to stop or control worrying 3   3. Worrying too much about different things 3   4. Trouble relaxing 2   5. Being so restless that it is hard to sit still 0   6. Becoming easily annoyed or irritable 2   7. Feeling afraid, as if something awful might happen 1   DWIGHT-7 Total Score 14   If you checked any problems, how difficult have they made it for you to do your work, take care of things at home, or get along with other people? Somewhat difficult           Review of Systems  Constitutional, " HEENT, cardiovascular, pulmonary, GI, , musculoskeletal, neuro, skin, endocrine and psych systems are negative, except as otherwise noted.      Objective    Vitals - Patient Reported  Pain Score: No Pain (0)        Physical Exam   Insert SmartText      GENERAL: alert and no distress  EYES: Eyes grossly normal to inspection.  No discharge or erythema, or obvious scleral/conjunctival abnormalities.  RESP: No audible wheeze, cough, or visible cyanosis.    SKIN: Visible skin clear. No significant rash, abnormal pigmentation or lesions.  NEURO: Cranial nerves grossly intact.  Mentation and speech appropriate for age.  PSYCH: Appropriate affect, tone, and pace of words          Video-Visit Details    Type of service:  Video Visit     Originating Location (pt. Location): Home    Distant Location (provider location):  On-site  Platform used for Video Visit: Bobo  Signed Electronically by: Deanna Tam MD

## 2024-08-14 ENCOUNTER — MYC REFILL (OUTPATIENT)
Dept: FAMILY MEDICINE | Facility: CLINIC | Age: 24
End: 2024-08-14

## 2024-08-14 DIAGNOSIS — F41.1 GAD (GENERALIZED ANXIETY DISORDER): ICD-10-CM

## 2024-08-14 RX ORDER — SERTRALINE HYDROCHLORIDE 25 MG/1
25 TABLET, FILM COATED ORAL DAILY
Qty: 90 TABLET | Refills: 0 | Status: SHIPPED | OUTPATIENT
Start: 2024-08-14 | End: 2024-09-09

## 2024-08-15 ENCOUNTER — OFFICE VISIT (OUTPATIENT)
Dept: DERMATOLOGY | Facility: CLINIC | Age: 24
End: 2024-08-15

## 2024-08-15 VITALS — DIASTOLIC BLOOD PRESSURE: 60 MMHG | SYSTOLIC BLOOD PRESSURE: 104 MMHG

## 2024-08-15 DIAGNOSIS — L70.0 ACNE VULGARIS: Primary | ICD-10-CM

## 2024-08-15 DIAGNOSIS — L81.0 POST-INFLAMMATORY HYPERPIGMENTATION: ICD-10-CM

## 2024-08-15 PROCEDURE — 99214 OFFICE O/P EST MOD 30 MIN: CPT | Performed by: PHYSICIAN ASSISTANT

## 2024-08-15 RX ORDER — SPIRONOLACTONE 50 MG/1
50 TABLET, FILM COATED ORAL DAILY
Qty: 90 TABLET | Refills: 3 | Status: SHIPPED | OUTPATIENT
Start: 2024-08-15

## 2024-08-15 RX ORDER — TRETINOIN 0.25 MG/G
CREAM TOPICAL
Qty: 45 G | Refills: 1 | Status: SHIPPED | OUTPATIENT
Start: 2024-08-15

## 2024-08-15 NOTE — LETTER
8/15/2024      Zari Luke  [address]  [place] [address]      Dear Colleague,    Thank you for referring your patient, Zari Luke, to the [facility]. Please see a copy of my visit note below.    [facility] Dermatology Note  Encounter Date: Aug 15, 2024  Office Visit      Dermatology Problem List:  1. Acne, hormonal component - PIH  -Current tx: spironolactone 50mg every day, tretinoin 0.025% cream nightly  -Previous tx: tretinoin (too drying), OCPs (too many adverse effects)  ____________________________________________    Assessment & Plan:  # Acne with PIH, hormonal component  - Side effects of spironolactone discussed including postural hypotension, increased frequency of urination, breast tenderness, menstrual spotting, cardiac arrythmias and the need for contraception due to fetal feminization. Discussed with patient that medication will need to be stopped if pregnancy is desired.  - Restart Spironolactone 50mg daily   - Restart on tretinoin 0.025% cream, use every other day and slowly work up to nightly  - Continue daily sunscreen, at least SPF 30    Procedures Performed:   none    Follow-up: 3 month(s) in-person, or earlier for new or changing lesions    Staff and scribe:    Scribe Disclosure:   I, NATE CHUA, am serving as a scribe; to document services personally performed by Sharon Shabazz PA-C -based on data collection and the provider's statements to me.     Provider Disclosure:  I agree with above History, Review of Systems, Physical exam and Plan.  I have reviewed the content of the documentation and have edited it as needed. I have personally performed the services documented here and the documentation accurately represents those services and the decisions I have made.      Electronically signed by:    All risks, benefits and alternatives were discussed with patient.  Patient is in agreement and understands the assessment and  plan.  All questions were answered.    Sharon Shabazz PA-C, MPAS  [facility] [place]: Phone: [phone], Fax: [phone]  [facility]: Phone: [phone],  Fax: [phone]  ____________________________________________    CC: Acne ( Recheck/Refill spironolactone 50mg)    HPI:  Ms. Zari Luke is a 24 year old female who presents today as return patient for a acne follow up. Denies menstrual irregularities, breast tenderness, or excessive urination. She notes that she did so much better on the spironolactone that she thought she could stop it. However her acne came back. Now she would like to restart the spironolactone.     Patient is otherwise feeling well, without additional concerns.    Labs:  none    Physical Exam:  Vitals: LMP 05/30/2024 (Approximate)   BP: 104/60  SKIN: Acne exam, which includes the face, neck, upper central chest, and upper central back was performed.   - few comedones on the forehead, brown macules on the cheeks    - dark brown macules on the cheeks  - No other lesions of concern on areas examined.     Medications:  Current Outpatient Medications   Medication Sig Dispense Refill     ALPRAZolam (XANAX) 0.25 MG tablet Take 1 tablet (0.25 mg) by mouth daily as needed for anxiety 10 tablet 0     sertraline (ZOLOFT) 25 MG tablet Take 1 tablet (25 mg) by mouth daily 90 tablet 0     No current facility-administered medications for this visit.      Past Medical/Surgical History:   Patient Active Problem List   Diagnosis     Episodic tension-type headache, not intractable     Fatigue, unspecified type     Menorrhagia with regular cycle     Neck pain     Daily headache     Cervical pain     Tension headache     Contraceptive management     Nonintractable episodic headache, unspecified headache type     Thyroid nodule     GERD (gastroesophageal reflux disease)     BYRON (generalized anxiety disorder)     Past  Medical History:   Diagnosis Date     Chronic tonsillitis 2005    s/p tonsillectomy     Left forearm fracture 2009     Mononucleosis 10/2014                        Again, thank you for allowing me to participate in the care of your patient.        Sincerely,        Sharon Shabazz PA-C

## 2024-08-15 NOTE — PATIENT INSTRUCTIONS
Proper skin care from [facility]:    -Eliminate harsh soaps as they strip the natural oils from the skin, often resulting in dry itchy skin ( i.e. Dial, Zest, [place] Spring)  -Use mild soaps such as Cetaphil or Dove Sensitive Skin in the shower. You do not need to use soap on arms, legs, and trunk every time you shower unless visibly soiled.   -Avoid hot or cold showers.  -After showering, lightly dry off and apply moisturizing within 2-3 minutes. This will help trap moisture in the skin.   -Aggressive use of a moisturizer at least 1-2 times a day to the entire body (including -Vanicream, Cetaphil, Aquaphor or Cerave) and moisturize hands after every washing.  -We recommend using moisturizers that come in a tub that needs to be scooped out, not a pump. This has more of an oil base. It will hold moisture in your skin much better than a water base moisturizer. The above recommended are non-pore clogging.      Wear a sunscreen with at least SPF 30 on your face, ears, neck and V of the chest daily. Wear sunscreen on other areas of the body if those areas are exposed to the sun throughout the day. Sunscreens can contain physical and/or chemical blockers. Physical blockers are less likely to clog pores, these include zinc oxide and titanium dioxide. Reapply every two hour and after swimming.     Sunscreen examples: https://www.ewg.org/sunscreen/    UV radiation  UVA radiation remains constant throughout the day and throughout the year. It is a longer wavelength than UVB and therefore penetrates deeper into the skin leading to immediate and delayed tanning, photoaging, and skin cancer. 70-80% of UVA and UVB radiation occurs between the hours of 10am-2pm.  UVB radiation  UVB radiation causes the most harmful effects and is more significant during the summer months. However, snow and ice can reflect UVB radiation leading to skin damage during the winter months as well. UVB radiation is responsible for tanning,  burning, inflammation, delayed erythema (pinkness), pigmentation (brown spots), and skin cancer.     I recommend self monthly full body exams and yearly full body exams with a dermatology provider. If you develop a new or changing lesion please follow up for examination. Most skin cancers are pink and scaly or pink and pearly. However, we do see blue/brown/black skin cancers.  Consider the ABCDEs of melanoma when giving yourself your monthly full body exam ( don't forget the groin, buttocks, feet, toes, etc). A-asymmetry, B-borders, C-color, D-diameter, E-elevation or evolving. If you see any of these changes please follow up in clinic. If you cannot see your back I recommend purchasing a hand held mirror to use with a larger wall mirror.       Checking for Skin Cancer  You can find cancer early by checking your skin each month. There are 3 kinds of skin cancer. They are melanoma, basal cell carcinoma, and squamous cell carcinoma. Doing monthly skin checks is the best way to find new marks or skin changes. Follow the instructions below for checking your skin.   The ABCDEs of checking moles for melanoma   Check your moles or growths for signs of melanoma using ABCDE:   Asymmetry: the sides of the mole or growth don t match  Border: the edges are ragged, notched, or blurred  Color: the color within the mole or growth varies  Diameter: the mole or growth is larger than 6 mm (size of a pencil eraser)  Evolving: the size, shape, or color of the mole or growth is changing (evolving is not shown in the images below)    Checking for other types of skin cancer  Basal cell carcinoma or squamous cell carcinoma have symptoms such as:     A spot or mole that looks different from all other marks on your skin  Changes in how an area feels, such as itching, tenderness, or pain  Changes in the skin's surface, such as oozing, bleeding, or scaliness  A sore that does not heal  New swelling or redness beyond the border of a  mole    Who s at risk?  Anyone can get skin cancer. But you are at greater risk if you have:   Fair skin, light-colored hair, or light-colored eyes  Many moles or abnormal moles on your skin  A history of sunburns from sunlight or tanning beds  A family history of skin cancer  A history of exposure to radiation or chemicals  A weakened immune system  If you have had skin cancer in the past, you are at risk for recurring skin cancer.   How to check your skin  Do your monthly skin checkups in front of a full-length mirror. Check all parts of your body, including your:   Head (ears, face, neck, and scalp)  Torso (front, back, and sides)  Arms (tops, undersides, upper, and lower armpits)  Hands (palms, backs, and fingers, including under the nails)  Buttocks and genitals  Legs (front, back, and sides)  Feet (tops, soles, toes, including under the nails, and between toes)  If you have a lot of moles, take digital photos of them each month. Make sure to take photos both up close and from a distance. These can help you see if any moles change over time.   Most skin changes are not cancer. But if you see any changes in your skin, call your doctor right away. Only he or she can diagnose a problem. If you have skin cancer, seeing your doctor can be the first step toward getting the treatment that could save your life.   [employer] last reviewed this educational content on 4/1/2019 2000-2020 [employer]. [address] [place] [address], [place] [address]. All rights reserved. This information is not intended as a substitute for professional medical care. Always follow your healthcare professional's instructions.       When should I call my doctor?  If you are worsening or not improving, please, contact us or seek urgent care as noted below.     Who should I call with questions (adults)?    [facility] [phone]  For urgent needs outside of business hours call the [facility] at  [phone] and ask for the dermatology resident on call to be paged  If this is a medical emergency and you are unable to reach an ER, Call 911      If you need a prescription refill, please contact your pharmacy. Refills are approved or denied by our Physicians during normal business hours, Monday through Fridays  Per office policy, refills will not be granted if you have not been seen within the past year (or sooner depending on your child's condition)

## 2024-08-15 NOTE — PROGRESS NOTES
[facility] Dermatology Note  Encounter Date: Aug 15, 2024  Office Visit      Dermatology Problem List:  1. Acne, hormonal component - PIH  -Current tx: spironolactone 50mg every day, tretinoin 0.025% cream nightly  -Previous tx: tretinoin (too drying), OCPs (too many adverse effects)  ____________________________________________    Assessment & Plan:  # Acne with PIH, hormonal component  - Side effects of spironolactone discussed including postural hypotension, increased frequency of urination, breast tenderness, menstrual spotting, cardiac arrythmias and the need for contraception due to fetal feminization. Discussed with patient that medication will need to be stopped if pregnancy is desired.  - Restart Spironolactone 50mg daily   - Restart on tretinoin 0.025% cream, use every other day and slowly work up to nightly  - Continue daily sunscreen, at least SPF 30    Procedures Performed:   none    Follow-up: 3 month(s) in-person, or earlier for new or changing lesions    Staff and scribe:    Scribe Disclosure:   I, NATE CHUA, am serving as a scribe; to document services personally performed by Sharon Shabazz PA-C -based on data collection and the provider's statements to me.     Provider Disclosure:  I agree with above History, Review of Systems, Physical exam and Plan.  I have reviewed the content of the documentation and have edited it as needed. I have personally performed the services documented here and the documentation accurately represents those services and the decisions I have made.      Electronically signed by:    All risks, benefits and alternatives were discussed with patient.  Patient is in agreement and understands the assessment and plan.  All questions were answered.    Sharon Shabazz PA-C, MPAS  [facility] [place]: Phone: [phone], Fax: [phone]  [facility]: Phone: [phone],   Fax: [phone]  ____________________________________________    CC: Acne ( Recheck/Refill spironolactone 50mg)    HPI:  Ms. Zari Luke is a 24 year old female who presents today as return patient for a acne follow up. Denies menstrual irregularities, breast tenderness, or excessive urination. She notes that she did so much better on the spironolactone that she thought she could stop it. However her acne came back. Now she would like to restart the spironolactone.     Patient is otherwise feeling well, without additional concerns.    Labs:  none    Physical Exam:  Vitals: LMP 05/30/2024 (Approximate)   BP: 104/60  SKIN: Acne exam, which includes the face, neck, upper central chest, and upper central back was performed.   - few comedones on the forehead, brown macules on the cheeks    - dark brown macules on the cheeks  - No other lesions of concern on areas examined.     Medications:  Current Outpatient Medications   Medication Sig Dispense Refill    ALPRAZolam (XANAX) 0.25 MG tablet Take 1 tablet (0.25 mg) by mouth daily as needed for anxiety 10 tablet 0    sertraline (ZOLOFT) 25 MG tablet Take 1 tablet (25 mg) by mouth daily 90 tablet 0     No current facility-administered medications for this visit.      Past Medical/Surgical History:   Patient Active Problem List   Diagnosis    Episodic tension-type headache, not intractable    Fatigue, unspecified type    Menorrhagia with regular cycle    Neck pain    Daily headache    Cervical pain    Tension headache    Contraceptive management    Nonintractable episodic headache, unspecified headache type    Thyroid nodule    GERD (gastroesophageal reflux disease)    BYRON (generalized anxiety disorder)     Past Medical History:   Diagnosis Date    Chronic tonsillitis 2005    s/p tonsillectomy    Left forearm fracture 2009    Mononucleosis 10/2014

## 2024-09-09 ENCOUNTER — VIRTUAL VISIT (OUTPATIENT)
Dept: FAMILY MEDICINE | Facility: CLINIC | Age: 24
End: 2024-09-09
Payer: COMMERCIAL

## 2024-09-09 DIAGNOSIS — F41.1 GAD (GENERALIZED ANXIETY DISORDER): ICD-10-CM

## 2024-09-09 PROCEDURE — G2211 COMPLEX E/M VISIT ADD ON: HCPCS | Mod: 95 | Performed by: FAMILY MEDICINE

## 2024-09-09 PROCEDURE — 99213 OFFICE O/P EST LOW 20 MIN: CPT | Mod: 95 | Performed by: FAMILY MEDICINE

## 2024-09-09 PROCEDURE — 96127 BRIEF EMOTIONAL/BEHAV ASSMT: CPT | Mod: 95 | Performed by: FAMILY MEDICINE

## 2024-09-09 RX ORDER — SERTRALINE HYDROCHLORIDE 25 MG/1
25 TABLET, FILM COATED ORAL DAILY
Qty: 90 TABLET | Refills: 1 | Status: SHIPPED | OUTPATIENT
Start: 2024-09-09

## 2024-09-09 ASSESSMENT — ANXIETY QUESTIONNAIRES
GAD7 TOTAL SCORE: 4
7. FEELING AFRAID AS IF SOMETHING AWFUL MIGHT HAPPEN: SEVERAL DAYS
GAD7 TOTAL SCORE: 4
8. IF YOU CHECKED OFF ANY PROBLEMS, HOW DIFFICULT HAVE THESE MADE IT FOR YOU TO DO YOUR WORK, TAKE CARE OF THINGS AT HOME, OR GET ALONG WITH OTHER PEOPLE?: NOT DIFFICULT AT ALL
GAD7 TOTAL SCORE: 4

## 2024-09-09 NOTE — PROGRESS NOTES
Zari is a 24 year old who is being evaluated via a billable video visit.    How would you like to obtain your AVS? MyChart  If the video visit is dropped, the invitation should be resent by: Text to cell phone: [phone]  Will anyone else be joining your video visit? No      Assessment & Plan     BYRON (generalized anxiety disorder)    - sertraline (ZOLOFT) 25 MG tablet; Take 1 tablet (25 mg) by mouth daily.      Symptoms well controlled.  Continue the use of sertraline 25 mg daily.  Refill medication ordered.  Follow-up in 6 months for recheck.  Patient is in agreement    The longitudinal plan of care for the diagnosis(es)/condition(s) as documented were addressed during this visit. Due to the added complexity in care, I will continue to support Zari in the subsequent management and with ongoing continuity of care.            Subjective   Zari is a 24 year old, presenting for the following health issues:  Recheck Medication (Zoloft )        9/9/2024     5:05 PM   Additional Questions   Roomed by Karina TOBIN     History of Present Illness       Mental Health Follow-up:  Patient presents to follow-up on Anxiety.    Patient's anxiety since last visit has been:  Better  The patient is not having other symptoms associated with anxiety.  Any significant life events: No  Patient is not feeling anxious or having panic attacks.  Patient has no concerns about alcohol or drug use.    She eats 2-3 servings of fruits and vegetables daily.She consumes 0 sweetened beverage(s) daily.She exercises with enough effort to increase her heart rate 10 to 19 minutes per day.  She exercises with enough effort to increase her heart rate 3 or less days per week.   She is taking medications regularly.       Anxiety   How are you doing with your anxiety since your last visit? Improved, Patient says has some symptoms of anxiety but they are not as intense/severe as they were before   Are you having other symptoms that might be associated with  anxiety? No  Have you had a significant life event? No   Are you feeling depressed? No  Do you have any concerns with your use of alcohol or other drugs? No    Social History     Tobacco Use    Smoking status: Never     Passive exposure: Never    Smokeless tobacco: Never   Vaping Use    Vaping status: Never Used   Substance Use Topics    Alcohol use: No     Alcohol/week: 0.0 standard drinks of alcohol    Drug use: No         6/25/2024     3:49 PM 9/9/2024     5:05 PM   BYRON-7 SCORE   Total Score 14 (moderate anxiety) 4 (minimal anxiety)   Total Score 14 4          No data to display                How many days per week do you miss taking your medication? 0  Medication Followup of Zoloft   Taking Medication as prescribed: yes  Side Effects:  None  Medication Helping Symptoms:  yes      Review of Systems  CONSTITUTIONAL: NEGATIVE for fever, chills, change in weight      Objective           Vitals:  No vitals were obtained today due to virtual visit.    Physical Exam   GENERAL: alert and no distress  EYES: Eyes grossly normal to inspection.  No discharge or erythema, or obvious scleral/conjunctival abnormalities.  RESP: No audible wheeze, cough, or visible cyanosis.    SKIN: Visible skin clear. No significant rash, abnormal pigmentation or lesions.  NEURO: Cranial nerves grossly intact.  Mentation and speech appropriate for age.  PSYCH: Appropriate affect, tone, and pace of words          Video-Visit Details    Type of service:  Video Visit   Video End Time:  Originating Location (pt. Location): Home    Distant Location (provider location):  On-site  Platform used for Video Visit: Bobo  Signed Electronically by: Rick Reeves MD

## 2024-10-25 ENCOUNTER — NURSE TRIAGE (OUTPATIENT)
Dept: FAMILY MEDICINE | Facility: CLINIC | Age: 24
End: 2024-10-25
Payer: COMMERCIAL

## 2024-10-25 NOTE — TELEPHONE ENCOUNTER
"Pt calling stating that she took her medication and she was not sitting upright and is now having chest discomfort and discomfort in her throat. Pt states she threw up and is now having difficulty getting water down. Advised pt to go to  now. Pt in agreement with plan.     Frances Carrillo RN    Reason for Disposition    SEVERE symptoms of pill stuck in throat or esophagus (e.g., severe pain, bleeding, or difficulty swallowing liquids)    Additional Information    Negative: SEVERE difficulty swallowing (e.g., drooling or spitting) and started suddenly after taking a medicine or allergic foods    Negative: Wheezing, stridor, hoarseness, or difficulty breathing    Negative: Swollen tongue and sudden onset    Negative: Sounds like a life-threatening emergency to the triager    Negative: SEVERE difficulty swallowing (e.g., drooling or spitting, can't swallow water)    Negative: Symptoms of food or bone stuck in throat or esophagus (e.g., pain in throat or chest, FB sensation, blood-tinged saliva)    Answer Assessment - Initial Assessment Questions  1. DESCRIPTION: \"Tell me more about this problem.\" \"Are you  having trouble swallowing liquids, solids, or both?\" \"Any trouble with swallowing saliva (spit)?\"      Both     2. SEVERITY: \"How bad is the swallowing difficulty?\"  (e.g., Scale 1-10; or mild, moderate, severe)    - MILD (0-3): Occasional swallowing difficulty; has trouble swallowing certain types of foods or liquids.    - MODERATE (4-7): Frequent swallowing difficulty; only able to swallow small amounts of foods and fluids.    - SEVERE (8-10): Unable to swallow any foods, fluids, or saliva; sensation of \"lump in throat\" or \"something stuck in throat\", and frequent drooling or spitting may be present.      Moderate     3. ONSET: \"When did the swallowing problems begin?\"       Today     4. CAUSE: \"What do you think is causing the problem?\"  (e.g., dry mouth, food or pill stuck in throat, mouth pain, sore throat, " "progression of disease process such as dementia or Parkinson's disease).       Took medication not sitting upright     5. CHRONIC or RECURRENT: \"Is this a new problem for you?\"  If No, ask: \"How long have you had this problem?\" (e.g., days, weeks, months)       No    6. OTHER SYMPTOMS: \"Do you have any other symptoms?\" (e.g., chest pain, difficulty breathing, mouth sores, sore throat, swollen tongue, chest pain)      Vomited     7. PREGNANCY: \"Is there any chance you are pregnant?\" \"When was your last menstrual period?\"      Did not ask    Protocols used: Swallowing Difficulty-A-OH    "

## 2024-11-25 ENCOUNTER — VIRTUAL VISIT (OUTPATIENT)
Dept: FAMILY MEDICINE | Facility: CLINIC | Age: 24
End: 2024-11-25
Payer: COMMERCIAL

## 2024-11-25 DIAGNOSIS — F41.1 GAD (GENERALIZED ANXIETY DISORDER): Primary | ICD-10-CM

## 2024-11-25 PROCEDURE — G2211 COMPLEX E/M VISIT ADD ON: HCPCS | Mod: 95 | Performed by: FAMILY MEDICINE

## 2024-11-25 PROCEDURE — 99214 OFFICE O/P EST MOD 30 MIN: CPT | Mod: 95 | Performed by: FAMILY MEDICINE

## 2024-11-25 ASSESSMENT — ANXIETY QUESTIONNAIRES
8. IF YOU CHECKED OFF ANY PROBLEMS, HOW DIFFICULT HAVE THESE MADE IT FOR YOU TO DO YOUR WORK, TAKE CARE OF THINGS AT HOME, OR GET ALONG WITH OTHER PEOPLE?: SOMEWHAT DIFFICULT
2. NOT BEING ABLE TO STOP OR CONTROL WORRYING: MORE THAN HALF THE DAYS
7. FEELING AFRAID AS IF SOMETHING AWFUL MIGHT HAPPEN: SEVERAL DAYS
3. WORRYING TOO MUCH ABOUT DIFFERENT THINGS: SEVERAL DAYS
4. TROUBLE RELAXING: NEARLY EVERY DAY
5. BEING SO RESTLESS THAT IT IS HARD TO SIT STILL: NOT AT ALL
IF YOU CHECKED OFF ANY PROBLEMS ON THIS QUESTIONNAIRE, HOW DIFFICULT HAVE THESE PROBLEMS MADE IT FOR YOU TO DO YOUR WORK, TAKE CARE OF THINGS AT HOME, OR GET ALONG WITH OTHER PEOPLE: SOMEWHAT DIFFICULT
GAD7 TOTAL SCORE: 10
1. FEELING NERVOUS, ANXIOUS, OR ON EDGE: MORE THAN HALF THE DAYS
6. BECOMING EASILY ANNOYED OR IRRITABLE: SEVERAL DAYS
7. FEELING AFRAID AS IF SOMETHING AWFUL MIGHT HAPPEN: SEVERAL DAYS

## 2024-11-25 ASSESSMENT — ENCOUNTER SYMPTOMS: NERVOUS/ANXIOUS: 1

## 2024-11-25 NOTE — PROGRESS NOTES
Zari is a 24 year old who is being evaluated via a billable video visit.    What phone number would you like to be contacted at? [phone]   How would you like to obtain your AVS? Isaakhart      Assessment & Plan     BYRON (generalized anxiety disorder)  Symptoms are not well-controlled.  Increase the dose of sertraline from 25 to 50 mg daily.  If in the next 3 to 4 weeks no improvement noted, patient instructed to notify me back.  She agrees to the plan for- sertraline (ZOLOFT) 50 MG tablet; Take 1 tablet (50 mg) by mouth daily.    The longitudinal plan of care for the diagnosis(es)/condition(s) as documented were addressed during this visit. Due to the added complexity in care, I will continue to support Zari in the subsequent management and with ongoing continuity of care.              Subjective   Zari is a 24 year old, presenting for the following health issues:  Anxiety        11/25/2024     4:37 PM   Additional Questions   Roomed by Anna Marie Pacheco Start Time:     Anxiety    History of Present Illness       Mental Health Follow-up:  Patient presents to follow-up on Anxiety.    Patient's anxiety since last visit has been:  Worse  The patient is having other symptoms associated with anxiety.  Any significant life events: No  Patient is feeling anxious or having panic attacks.  Patient has no concerns about alcohol or drug use.    She eats 0-1 servings of fruits and vegetables daily.She consumes 1 sweetened beverage(s) daily.She exercises with enough effort to increase her heart rate 20 to 29 minutes per day.  She exercises with enough effort to increase her heart rate 3 or less days per week.   She is taking medications regularly.      Patient would like to discuss increasing Zoloft dose. Patient reports she's been feeling a lot more anxious.     Social History     Tobacco Use    Smoking status: Never     Passive exposure: Never    Smokeless tobacco: Never   Vaping Use    Vaping status: Never Used   Substance  Use Topics    Alcohol use: No     Alcohol/week: 0.0 standard drinks of alcohol    Drug use: No         6/25/2024     3:49 PM 9/9/2024     5:05 PM 11/25/2024     4:35 PM   BYRON-7 SCORE   Total Score 14 (moderate anxiety) 4 (minimal anxiety) 10 (moderate anxiety)   Total Score 14 4 10        Patient-reported          No data to display                  11/25/2024     4:35 PM   BYRON-7    1. Feeling nervous, anxious, or on edge 2    2. Not being able to stop or control worrying 2    3. Worrying too much about different things 1    4. Trouble relaxing 3    5. Being so restless that it is hard to sit still 0    6. Becoming easily annoyed or irritable 1    7. Feeling afraid, as if something awful might happen 1    BYRON-7 Total Score 10    If you checked any problems, how difficult have they made it for you to do your work, take care of things at home, or get along with other people? Somewhat difficult        Patient-reported           Review of Systems  CONSTITUTIONAL: NEGATIVE for fever, chills, change in weight  ENT/MOUTH: NEGATIVE for ear, mouth and throat problems  RESP: NEGATIVE for significant cough or SOB  CV: NEGATIVE for chest pain, palpitations or peripheral edema      Objective           Vitals:  No vitals were obtained today due to virtual visit.    Physical Exam   GENERAL: alert and no distress  EYES: Eyes grossly normal to inspection.  No discharge or erythema, or obvious scleral/conjunctival abnormalities.  RESP: No audible wheeze, cough, or visible cyanosis.    SKIN: Visible skin clear. No significant rash, abnormal pigmentation or lesions.  NEURO: Cranial nerves grossly intact.  Mentation and speech appropriate for age.  PSYCH: Appropriate affect, tone, and pace of words          Video-Visit Details    Type of service:  Video Visit   Video End Time:  Originating Location (pt. Location): Home    Distant Location (provider location):  On-site  Platform used for Video Visit: Bobo  Signed Electronically by:  Rick Reeves MD

## 2024-12-19 ENCOUNTER — PATIENT OUTREACH (OUTPATIENT)
Dept: CARE COORDINATION | Facility: CLINIC | Age: 24
End: 2024-12-19
Payer: COMMERCIAL

## 2025-01-02 ENCOUNTER — PATIENT OUTREACH (OUTPATIENT)
Dept: CARE COORDINATION | Facility: CLINIC | Age: 25
End: 2025-01-02
Payer: COMMERCIAL

## 2025-03-02 ENCOUNTER — HEALTH MAINTENANCE LETTER (OUTPATIENT)
Age: 25
End: 2025-03-02

## 2025-05-30 DIAGNOSIS — L70.0 ACNE VULGARIS: ICD-10-CM

## 2025-06-02 RX ORDER — SPIRONOLACTONE 50 MG/1
50 TABLET, FILM COATED ORAL DAILY
Qty: 90 TABLET | Refills: 3 | OUTPATIENT
Start: 2025-06-02

## 2025-06-24 DIAGNOSIS — F41.1 GAD (GENERALIZED ANXIETY DISORDER): ICD-10-CM

## 2025-06-25 NOTE — TELEPHONE ENCOUNTER
Patient is a follow-up appointment scheduled  via virtual visit.  Refill ordered for now    Rick Reeves MD  [facility], [facility]

## 2025-08-30 DIAGNOSIS — F41.1 GAD (GENERALIZED ANXIETY DISORDER): ICD-10-CM
